# Patient Record
Sex: FEMALE | Race: WHITE | NOT HISPANIC OR LATINO | Employment: FULL TIME | URBAN - METROPOLITAN AREA
[De-identification: names, ages, dates, MRNs, and addresses within clinical notes are randomized per-mention and may not be internally consistent; named-entity substitution may affect disease eponyms.]

---

## 2017-01-03 RX ORDER — CITALOPRAM 20 MG/1
30 TABLET ORAL DAILY
Qty: 135 TABLET | Refills: 1 | Status: SHIPPED | OUTPATIENT
Start: 2017-01-03 | End: 2017-04-03

## 2017-01-03 RX ORDER — BUSPIRONE HYDROCHLORIDE 10 MG/1
10 TABLET ORAL 3 TIMES DAILY PRN
Qty: 90 TABLET | Refills: 1 | Status: SHIPPED | OUTPATIENT
Start: 2017-01-03 | End: 2020-09-22

## 2017-07-05 RX ORDER — CITALOPRAM 20 MG/1
TABLET ORAL
Qty: 135 TABLET | Refills: 0 | Status: SHIPPED | OUTPATIENT
Start: 2017-07-05 | End: 2020-09-22

## 2020-09-22 ENCOUNTER — OFFICE VISIT (OUTPATIENT)
Dept: INTERNAL MEDICINE | Facility: CLINIC | Age: 35
End: 2020-09-22

## 2020-09-22 VITALS
HEIGHT: 65 IN | BODY MASS INDEX: 23.82 KG/M2 | HEART RATE: 66 BPM | WEIGHT: 143 LBS | SYSTOLIC BLOOD PRESSURE: 102 MMHG | TEMPERATURE: 98.2 F | DIASTOLIC BLOOD PRESSURE: 68 MMHG | OXYGEN SATURATION: 99 %

## 2020-09-22 DIAGNOSIS — K21.9 GASTROESOPHAGEAL REFLUX DISEASE, ESOPHAGITIS PRESENCE NOT SPECIFIED: ICD-10-CM

## 2020-09-22 DIAGNOSIS — D22.9 NUMEROUS SKIN MOLES: ICD-10-CM

## 2020-09-22 DIAGNOSIS — F41.9 ANXIETY: Primary | ICD-10-CM

## 2020-09-22 PROBLEM — I47.1 SVT (SUPRAVENTRICULAR TACHYCARDIA): Status: ACTIVE | Noted: 2020-09-22

## 2020-09-22 PROBLEM — I47.10 SVT (SUPRAVENTRICULAR TACHYCARDIA): Status: ACTIVE | Noted: 2020-09-22

## 2020-09-22 PROCEDURE — 90686 IIV4 VACC NO PRSV 0.5 ML IM: CPT | Performed by: NURSE PRACTITIONER

## 2020-09-22 PROCEDURE — 99214 OFFICE O/P EST MOD 30 MIN: CPT | Performed by: NURSE PRACTITIONER

## 2020-09-22 PROCEDURE — 90471 IMMUNIZATION ADMIN: CPT | Performed by: NURSE PRACTITIONER

## 2020-09-22 RX ORDER — PANTOPRAZOLE SODIUM 40 MG/1
40 TABLET, DELAYED RELEASE ORAL DAILY
Qty: 30 TABLET | Refills: 0 | Status: SHIPPED | OUTPATIENT
Start: 2020-09-22 | End: 2021-01-23

## 2020-09-22 NOTE — PROGRESS NOTES
"Jazzy Negrete is a 34 y.o. female who presents today to establish care and discuss anxiety.    Chief Complaint   Patient presents with   • Establish Care       34 year old female presents today to establish care and for medication refill of Sertraline. She reports anxiety is well controlled with medication. She complains of \"heart burn\" despite eliminating foods/beverages that could cause these symptoms.    Anxiety  Presents for follow-up visit. Symptoms include palpitations (yesterday last episode). Patient reports no chest pain or nausea. Symptoms occur rarely. The most recent episode lasted 30 minutes (at the most). The severity of symptoms is mild. The patient sleeps 8 hours per night. The quality of sleep is fair. Nighttime awakenings: several (3 times a night).     Compliance with medications is %. Treatment side effects: dizziness at first, but currently no side effects.       The following portions of the patient's history were reviewed and updated as appropriate: allergies, current medications, past family history, past medical history, past social history, past surgical history and problem list.     Past Medical History:   Diagnosis Date   • Anxiety    • SVT (supraventricular tachycardia) (CMS/HCC)        Past Surgical History:   Procedure Laterality Date   • CARDIAC ELECTROPHYSIOLOGY PROCEDURE     • DILATATION AND CURETTAGE     • LASIK     • WISDOM TOOTH EXTRACTION         Family History   Problem Relation Age of Onset   • Lupus Mother    • Hypertension Mother    • Hyperlipidemia Mother    • Arthritis Mother    • Diabetes Father    • Hypertension Father    • Hyperlipidemia Father    • Heart disease Father    • Celiac disease Brother    • Ulcerative colitis Brother        Social History     Socioeconomic History   • Marital status:      Spouse name: Not on file   • Number of children: Not on file   • Years of education: Not on file   • Highest education level: Not on file   Tobacco Use   • " "Smoking status: Never Smoker   Substance and Sexual Activity   • Alcohol use: Yes   • Drug use: Never   • Sexual activity: Yes     Partners: Male       No Known Allergies      Current Outpatient Medications:   •  metoprolol succinate XL (TOPROL-XL) 25 MG 24 hr tablet, Take 1 tablet by mouth Daily., Disp: 30 tablet, Rfl: 5  •  sertraline (ZOLOFT) 50 MG tablet, Take 1 tablet by mouth Daily., Disp: 90 tablet, Rfl: 0  •  pantoprazole (Protonix) 40 MG EC tablet, Take 1 tablet by mouth Daily., Disp: 30 tablet, Rfl: 0    Health Maintenance   Topic Date Due   • ANNUAL PHYSICAL  09/23/1989   • TDAP/TD VACCINES (1 - Tdap) 09/23/2005   • HEPATITIS C SCREENING  05/31/2016   • INFLUENZA VACCINE  08/01/2020   • PAP SMEAR  09/22/2022   •  AMB Pneumococcal Vaccine 65+ (1 of 1 - PPSV23) 09/23/2051   •  AMB Pneumococcal Vaccine 0-64  Aged Out        ROS    Review of Systems   Constitutional: Negative for chills and fever.   HENT: Negative for ear pain, sneezing and sore throat.    Eyes: Negative for blurred vision and visual disturbance.   Respiratory: Negative for cough.    Cardiovascular: Positive for palpitations (yesterday last episode). Negative for chest pain and leg swelling.   Gastrointestinal: Positive for indigestion. Negative for abdominal pain and nausea.   Genitourinary: Negative for dysuria and hematuria.   Skin: Positive for skin lesions (moles). Negative for rash.   Allergic/Immunologic: Negative for immunocompromised state.   Neurological: Negative for headache.   Hematological: Negative for adenopathy.   Psychiatric/Behavioral: Positive for sleep disturbance (getting up several times during the night for 3-year old child).       Visit Vitals  /68   Pulse 66   Temp 98.2 °F (36.8 °C)   Ht 165.1 cm (65\")   Wt 64.9 kg (143 lb)   SpO2 99%   Breastfeeding No   BMI 23.80 kg/m²       Physical Exam     Physical Exam  Vitals signs and nursing note reviewed.   Constitutional:       General: She is not in acute " distress.     Appearance: She is well-developed and normal weight. She is not diaphoretic.   HENT:      Head: Normocephalic and atraumatic.   Eyes:      Conjunctiva/sclera: Conjunctivae normal.      Pupils: Pupils are equal, round, and reactive to light.   Neck:      Musculoskeletal: Normal range of motion and neck supple.   Cardiovascular:      Rate and Rhythm: Normal rate.      Heart sounds: Murmur present. No friction rub. No gallop.    Pulmonary:      Effort: Pulmonary effort is normal.      Breath sounds: Normal breath sounds.   Abdominal:      Palpations: Abdomen is soft.      Tenderness: There is no abdominal tenderness.   Musculoskeletal: Normal range of motion.   Skin:     General: Skin is warm and dry.      Capillary Refill: Capillary refill takes less than 2 seconds.      Findings: No rash.   Neurological:      Mental Status: She is alert and oriented to person, place, and time.   Psychiatric:         Attention and Perception: Attention normal.         Speech: Speech normal.         Behavior: Behavior normal. Behavior is cooperative.         Thought Content: Thought content normal.         Cognition and Memory: Cognition normal.         Judgment: Judgment normal.         Assessment/Plan   Problems Addressed this Visit        Digestive    Gastroesophageal reflux disease     Started Protonix 40 mg daily         Relevant Medications    pantoprazole (Protonix) 40 MG EC tablet    Other Relevant Orders    H.pylori,IgG / IgA Antibodies       Other    Anxiety - Primary     Continue Zoloft 50 mg daily.         Relevant Medications    sertraline (ZOLOFT) 50 MG tablet    Other Relevant Orders    CBC Auto Differential    Comprehensive Metabolic Panel    Lipid Panel    TSH Rfx On Abnormal To Free T4    Vitamin D 25 Hydroxy    Hemoglobin A1c    Hepatitis C Antibody      Other Visit Diagnoses     Numerous skin moles        Dermatology referral placed.    Relevant Orders    Ambulatory Referral to Dermatology (Completed)  "             Plan of care reviewed with patient at the conclusion of today's visit. Education was provided regarding diagnosis, management and any prescribed or recommended OTC medications.  Patient verbalizes understanding of and agreement with management plan.    “I, the teaching provider, verify the accuracy of all student documentation.  I further attest that I was physically present during the HPI portion of the encounter, and personally performed/re-performed the exam, assessment, and plan.\" MARINA Fernández, APRN, FNP-C      Return in about 3 months (around 12/22/2020) for Annual, Come back for fasting labs after your appointment.    Ang Whitehead APRN  "

## 2020-09-30 ENCOUNTER — LAB (OUTPATIENT)
Dept: LAB | Facility: HOSPITAL | Age: 35
End: 2020-09-30

## 2020-09-30 DIAGNOSIS — F41.9 ANXIETY: ICD-10-CM

## 2020-09-30 DIAGNOSIS — K21.9 GASTROESOPHAGEAL REFLUX DISEASE, ESOPHAGITIS PRESENCE NOT SPECIFIED: ICD-10-CM

## 2020-09-30 LAB
25(OH)D3 SERPL-MCNC: 54.6 NG/ML (ref 30–100)
ALBUMIN SERPL-MCNC: 4.5 G/DL (ref 3.5–5.2)
ALBUMIN/GLOB SERPL: 1.6 G/DL
ALP SERPL-CCNC: 46 U/L (ref 39–117)
ALT SERPL W P-5'-P-CCNC: 22 U/L (ref 1–33)
ANION GAP SERPL CALCULATED.3IONS-SCNC: 11.3 MMOL/L (ref 5–15)
AST SERPL-CCNC: 18 U/L (ref 1–32)
BASOPHILS # BLD AUTO: 0.03 10*3/MM3 (ref 0–0.2)
BASOPHILS NFR BLD AUTO: 0.7 % (ref 0–1.5)
BILIRUB SERPL-MCNC: 0.7 MG/DL (ref 0–1.2)
BUN SERPL-MCNC: 17 MG/DL (ref 6–20)
BUN/CREAT SERPL: 22.1 (ref 7–25)
CALCIUM SPEC-SCNC: 9.7 MG/DL (ref 8.6–10.5)
CHLORIDE SERPL-SCNC: 101 MMOL/L (ref 98–107)
CHOLEST SERPL-MCNC: 146 MG/DL (ref 0–200)
CO2 SERPL-SCNC: 27.7 MMOL/L (ref 22–29)
CREAT SERPL-MCNC: 0.77 MG/DL (ref 0.57–1)
DEPRECATED RDW RBC AUTO: 40.4 FL (ref 37–54)
EOSINOPHIL # BLD AUTO: 0.06 10*3/MM3 (ref 0–0.4)
EOSINOPHIL NFR BLD AUTO: 1.4 % (ref 0.3–6.2)
ERYTHROCYTE [DISTWIDTH] IN BLOOD BY AUTOMATED COUNT: 12 % (ref 12.3–15.4)
GFR SERPL CREATININE-BSD FRML MDRD: 86 ML/MIN/1.73
GLOBULIN UR ELPH-MCNC: 2.8 GM/DL
GLUCOSE SERPL-MCNC: 84 MG/DL (ref 65–99)
HBA1C MFR BLD: 4.8 % (ref 4.8–5.6)
HCT VFR BLD AUTO: 41.9 % (ref 34–46.6)
HCV AB SER DONR QL: NORMAL
HDLC SERPL-MCNC: 50 MG/DL (ref 40–60)
HGB BLD-MCNC: 14.1 G/DL (ref 12–15.9)
IMM GRANULOCYTES # BLD AUTO: 0.01 10*3/MM3 (ref 0–0.05)
IMM GRANULOCYTES NFR BLD AUTO: 0.2 % (ref 0–0.5)
LDLC SERPL CALC-MCNC: 84 MG/DL (ref 0–100)
LDLC/HDLC SERPL: 1.69 {RATIO}
LYMPHOCYTES # BLD AUTO: 1.28 10*3/MM3 (ref 0.7–3.1)
LYMPHOCYTES NFR BLD AUTO: 29.6 % (ref 19.6–45.3)
MCH RBC QN AUTO: 30.8 PG (ref 26.6–33)
MCHC RBC AUTO-ENTMCNC: 33.7 G/DL (ref 31.5–35.7)
MCV RBC AUTO: 91.5 FL (ref 79–97)
MONOCYTES # BLD AUTO: 0.27 10*3/MM3 (ref 0.1–0.9)
MONOCYTES NFR BLD AUTO: 6.3 % (ref 5–12)
NEUTROPHILS NFR BLD AUTO: 2.67 10*3/MM3 (ref 1.7–7)
NEUTROPHILS NFR BLD AUTO: 61.8 % (ref 42.7–76)
NRBC BLD AUTO-RTO: 0 /100 WBC (ref 0–0.2)
PLATELET # BLD AUTO: 262 10*3/MM3 (ref 140–450)
PMV BLD AUTO: 11 FL (ref 6–12)
POTASSIUM SERPL-SCNC: 4.3 MMOL/L (ref 3.5–5.2)
PROT SERPL-MCNC: 7.3 G/DL (ref 6–8.5)
RBC # BLD AUTO: 4.58 10*6/MM3 (ref 3.77–5.28)
SODIUM SERPL-SCNC: 140 MMOL/L (ref 136–145)
TRIGL SERPL-MCNC: 58 MG/DL (ref 0–150)
TSH SERPL DL<=0.05 MIU/L-ACNC: 1.93 UIU/ML (ref 0.27–4.2)
VLDLC SERPL-MCNC: 11.6 MG/DL (ref 5–40)
WBC # BLD AUTO: 4.32 10*3/MM3 (ref 3.4–10.8)

## 2020-09-30 PROCEDURE — 36415 COLL VENOUS BLD VENIPUNCTURE: CPT

## 2020-09-30 PROCEDURE — 86677 HELICOBACTER PYLORI ANTIBODY: CPT | Performed by: NURSE PRACTITIONER

## 2020-09-30 PROCEDURE — 84443 ASSAY THYROID STIM HORMONE: CPT | Performed by: NURSE PRACTITIONER

## 2020-09-30 PROCEDURE — 83036 HEMOGLOBIN GLYCOSYLATED A1C: CPT | Performed by: NURSE PRACTITIONER

## 2020-09-30 PROCEDURE — 85025 COMPLETE CBC W/AUTO DIFF WBC: CPT | Performed by: NURSE PRACTITIONER

## 2020-09-30 PROCEDURE — 82306 VITAMIN D 25 HYDROXY: CPT | Performed by: NURSE PRACTITIONER

## 2020-09-30 PROCEDURE — 80061 LIPID PANEL: CPT | Performed by: NURSE PRACTITIONER

## 2020-09-30 PROCEDURE — 86803 HEPATITIS C AB TEST: CPT | Performed by: NURSE PRACTITIONER

## 2020-09-30 PROCEDURE — 80053 COMPREHEN METABOLIC PANEL: CPT | Performed by: NURSE PRACTITIONER

## 2020-10-01 LAB
H PYLORI IGA SER-ACNC: <9 UNITS (ref 0–8.9)
H PYLORI IGG SER IA-ACNC: 0.18 INDEX VALUE (ref 0–0.79)

## 2020-11-30 RX ORDER — METRONIDAZOLE 7.5 MG/G
GEL TOPICAL 2 TIMES DAILY
Qty: 45 G | Refills: 1 | Status: SHIPPED | OUTPATIENT
Start: 2020-11-30 | End: 2021-05-05 | Stop reason: SDUPTHER

## 2020-12-03 RX ORDER — METOPROLOL SUCCINATE 25 MG/1
25 TABLET, EXTENDED RELEASE ORAL DAILY
Qty: 30 TABLET | Refills: 5 | OUTPATIENT
Start: 2020-12-03

## 2020-12-04 ENCOUNTER — PATIENT MESSAGE (OUTPATIENT)
Dept: INTERNAL MEDICINE | Facility: CLINIC | Age: 35
End: 2020-12-04

## 2020-12-04 DIAGNOSIS — K21.9 GASTROESOPHAGEAL REFLUX DISEASE: ICD-10-CM

## 2020-12-04 DIAGNOSIS — F41.9 ANXIETY: ICD-10-CM

## 2020-12-04 RX ORDER — METOPROLOL SUCCINATE 25 MG/1
25 TABLET, EXTENDED RELEASE ORAL DAILY
Qty: 30 TABLET | Refills: 2 | Status: SHIPPED | OUTPATIENT
Start: 2020-12-04 | End: 2021-01-23 | Stop reason: SDUPTHER

## 2020-12-21 DIAGNOSIS — F41.9 ANXIETY: ICD-10-CM

## 2020-12-22 ENCOUNTER — DOCUMENTATION (OUTPATIENT)
Dept: INTERNAL MEDICINE | Facility: CLINIC | Age: 35
End: 2020-12-22

## 2020-12-22 NOTE — TELEPHONE ENCOUNTER
Last Office Visit: 9/22/20  Next Office Visit: 1/13/21    Labs completed in past 6 months? yes  Labs completed in past year? yes    Last Refill Date: 9/22/20  Quantity: 90  Refills: 0    Pharmacy: on file

## 2021-01-23 ENCOUNTER — OFFICE VISIT (OUTPATIENT)
Dept: INTERNAL MEDICINE | Facility: CLINIC | Age: 36
End: 2021-01-23

## 2021-01-23 VITALS
DIASTOLIC BLOOD PRESSURE: 70 MMHG | HEART RATE: 70 BPM | TEMPERATURE: 97.6 F | BODY MASS INDEX: 25.69 KG/M2 | HEIGHT: 65 IN | OXYGEN SATURATION: 98 % | SYSTOLIC BLOOD PRESSURE: 126 MMHG | WEIGHT: 154.2 LBS

## 2021-01-23 DIAGNOSIS — Z00.00 HEALTHCARE MAINTENANCE: ICD-10-CM

## 2021-01-23 DIAGNOSIS — F41.1 GENERALIZED ANXIETY DISORDER: ICD-10-CM

## 2021-01-23 DIAGNOSIS — Z00.00 ANNUAL PHYSICAL EXAM: Primary | ICD-10-CM

## 2021-01-23 DIAGNOSIS — R00.2 PALPITATIONS: ICD-10-CM

## 2021-01-23 DIAGNOSIS — R63.5 WEIGHT GAIN: ICD-10-CM

## 2021-01-23 PROCEDURE — 99395 PREV VISIT EST AGE 18-39: CPT | Performed by: NURSE PRACTITIONER

## 2021-01-23 RX ORDER — METOPROLOL SUCCINATE 25 MG/1
25 TABLET, EXTENDED RELEASE ORAL DAILY
Qty: 30 TABLET | Refills: 2 | Status: SHIPPED | OUTPATIENT
Start: 2021-01-23 | End: 2021-01-23 | Stop reason: SDUPTHER

## 2021-01-23 RX ORDER — METOPROLOL SUCCINATE 25 MG/1
25 TABLET, EXTENDED RELEASE ORAL DAILY
Qty: 90 TABLET | Refills: 3 | Status: SHIPPED | OUTPATIENT
Start: 2021-01-23 | End: 2022-02-11

## 2021-01-23 NOTE — PROGRESS NOTES
After Visit Summary   10/24/2017    Velma Diaz    MRN: [id number]           Patient Information     Date Of Birth          1969        Visit Information        Provider Department      10/24/2017 3:20 PM Jessy Burgos APRN CNP [facility]        Today's Diagnoses     Cat bite, initial encounter    -  1       Follow-ups after your visit        Your next 10 appointments already scheduled     Nov 06, 2017 10:00 AM CST   Return Visit with SERVANDO Yan   [facility] ([address] [address]   [phone]            Nov 27, 2017 10:00 AM CST   Return Visit with SERVANDO Yan   [facility] ([address] [address]   [phone]            Nov 29, 2017 10:00 AM CST   Return Visit with Debra Hicks MD   [facility] ([address] [id number] [phone]              Who to contact     If you have questions or need follow up information about today's clinic visit or your schedule please contact [facility] directly at [phone].  Normal or non-critical lab and imaging results will be communicated to you by MyChart, letter or phone within 4 business days after the clinic has received the results. If you do not hear from us within 7 days, please contact the clinic through MyChart or phone. If you have a critical or abnormal lab result, we will notify you by phone as soon as possible.  Submit refill requests through [employer] or call your pharmacy and they will forward the refill request to us. Please allow 3 business days for your refill to be completed.          Additional Information About Your Visit        [employer] Information     [employer] gives you secure access to your electronic health  Chief Complaint   Patient presents with   • Annual Exam       History of Present Illness  35 y.o.female presents for health maintenance, Adult Female:   General Health: The patient's health is described as good. She denies vision problems. She denies hearing loss. Immunizations status reviewed and plan to update today if needed.  Chronic medical problems:  Anxiety  Presents for follow-up visit. Symptoms include palpitations. Patient reports no chest pain, depressed mood, dizziness, nausea, nervous/anxious behavior or shortness of breath. Symptoms occur occasionally. The severity of symptoms is mild. The quality of sleep is good.     Her past medical history is significant for anxiety/panic attacks. Compliance with medications is % (takes zoloft). Treatment side effects: None.   Palpitations   This is a chronic problem. The current episode started more than 1 year ago (Had SVT couple years ago). The problem occurs rarely. The problem has been gradually improving. Pertinent negatives include no anxiety, chest pain, coughing, dizziness, irregular heartbeat, nausea, shortness of breath or vomiting. Associated symptoms comments: No palpitations recently on medications. She has tried beta blockers for the symptoms. The treatment provided significant relief. Her past medical history is significant for anxiety.   wt gain; would like to have referral for exercise instruction  Social History/Lifestyle:   Social History     Socioeconomic History   • Marital status:    Tobacco Use   • Smoking status: Never Smoker   Substance and Sexual Activity   • Alcohol use: Yes   • Drug use: Never   • Sexual activity: Yes     Partners: Male     Reproductive health:  Mirena. Last pap 2019; has had abnl pap in past but has been greater than 10 years ago.  No vaginal pain, vaginal drainage, pelvic pain, flank pain, or dysuria.  No breast complaints.  Screening:   Health Maintenance reviewed.  Health Maintenance   Topic Date Due    • ANNUAL PHYSICAL  09/23/1989   • PAP SMEAR  09/22/2022   • TDAP/TD VACCINES (2 - Td) 03/05/2028   • HEPATITIS C SCREENING  Completed   • INFLUENZA VACCINE  Completed   • Pneumococcal Vaccine 0-64  Aged Out   • MENINGOCOCCAL VACCINE  Aged Out          Review of Systems   Constitutional: Positive for unexpected weight gain. Negative for chills and fatigue.   HENT: Negative for congestion, postnasal drip and rhinorrhea.    Eyes: Negative for blurred vision and visual disturbance.   Respiratory: Negative for cough and shortness of breath.    Cardiovascular: Positive for palpitations. Negative for chest pain and leg swelling.   Gastrointestinal: Negative for constipation, diarrhea, nausea and vomiting.   Genitourinary: Negative for difficulty urinating.   Musculoskeletal: Negative for arthralgias.   Skin: Negative for rash.   Neurological: Negative for dizziness and headache.   Psychiatric/Behavioral: Negative for depressed mood. The patient is not nervous/anxious.          [facility]  The following portions of the patient's history were reviewed and updated as appropriate: allergies, current medications, past family history, past medical history, past social history, past surgical history and problem list.     Past Medical History:   Diagnosis Date   • Anxiety    • SVT (supraventricular tachycardia) (CMS/HCC)       Past Surgical History:   Procedure Laterality Date   • CARDIAC ELECTROPHYSIOLOGY PROCEDURE     • DILATATION AND CURETTAGE     • LASIK     • WISDOM TOOTH EXTRACTION        No Known Allergies   Family History   Problem Relation Age of Onset   • Lupus Mother    • Hypertension Mother    • Hyperlipidemia Mother    • Arthritis Mother    • Diabetes Father    • Hypertension Father    • Hyperlipidemia Father    • Heart disease Father    • Celiac disease Brother    • Ulcerative colitis Brother             Current Outpatient Medications:   •  metoprolol succinate XL (TOPROL-XL) 25 MG 24 hr tablet, Take 1 tablet by mouth  record. If you see a primary care provider, you can also send messages to your care team and make appointments. If you have questions, please call your primary care clinic.  If you do not have a primary care provider, please call [phone] and they will assist you.        Care EveryWhere ID     This is your Care EveryWhere ID. This could be used by other organizations to access your [facility] [address] medical records  [id number]        Your Vitals Were     Pulse Temperature Pulse Oximetry Breastfeeding? BMI (Body Mass Index)       79 97  F (36.1  C) (Oral) 97% No 39.58 kg/m2        Blood Pressure from Last 3 Encounters:   10/24/17 96/67   10/18/17 111/78   10/12/17 107/69    Weight from Last 3 Encounters:   10/24/17 227 lb (103 kg)   10/18/17 222 lb (100.7 kg)   10/12/17 222 lb (100.7 kg)              We Performed the Following     IMMUNIZATION ADMIN, FIRST     TD PRESERV FREE >=7 YRS ADS IM          Today's Medication Changes          These changes are accurate as of: 10/24/17  4:04 PM.  If you have any questions, ask your nurse or doctor.               Start taking these medicines.        Dose/Directions    doxycycline 100 MG capsule   Commonly known as:  VIBRAMYCIN   Used for:  Cat bite, initial encounter   Started by:  Jessy Burgos APRN CNP        Dose:  100 mg   Take 1 capsule (100 mg) by mouth 2 times daily   Quantity:  20 capsule   Refills:  0            Where to get your medicines      These medications were sent to [facility]/pharmacy #[id number] - [id number] [address]     Phone:  [phone]     doxycycline 100 MG capsule                Primary Care Provider Office Phone # Fax #    Srinivasa Hunter [phone]       [address] [facility] [address]        Goals        General    I will call within next 2 weeks to schedule initial psychiatry appointment (pt-stated)     Notes - Note edited  5/20/2015 12:51 PM by Yesica Marsh     As of today's date 5/20/2015 goal is met at 76 - 100%.   Goal Status:  Complete  Patient states this is scheduled with Dr. Salvador for next month, but not on appointment calendar  LUDY Stark, MSW   [phone]  5/20/2015 12:51 PM        I will complete [employer] or reduced fare application within the next month (pt-stated)     Notes - Note edited  11/24/2015 12:00 PM by Yesica Marsh    As of today's date 11/24/2015 goal is met at 51 - 75%.   Goal Status:  Active  She reported today having [employer] application, [employer] worker has requested but packet from [employer].    ULDY Stark, MSW   [phone]  11/24/2015 12:00 PM        I will discuss [employer] worker with therapist next week for housing needs  (pt-stated)     Notes - Note edited  12/3/2015  1:17 PM by Yesica Marsh    As of today's date 12/3/2015 goal is met at 76 - 100%.   Goal Status:  Discontinued  Patient has [employer] worker and has found housing with friend  LUDY Stark, MSW   [phone]  12/3/2015 1:16 PM        Equal Access to Services     LOLA HERNANDEZ AH: Hadii aad ku hadasho Soomaali, waaxda luqadaha, qaybta kaalmada adeegyada, waxay idiin haysivan speedy sauceda. So [facility] [phone].    ATENCIÓN: Si habla español, tiene a dowd disposición servicios gratuitos de asistencia lingüística. Llame al [phone].    We comply with applicable federal civil rights laws and [place] laws. We do not discriminate on the basis of race, color, national origin, age, disability, sex, sexual orientation, or gender identity.            Thank you!     Thank you for choosing [facility]  for your care. Our goal is always to provide you with excellent care. Hearing back from our patients is one way we can continue to improve our services. Please take a few minutes to complete the written survey that you may receive in the mail after your visit with us. Thank you!             Your Updated  "Daily., Disp: 30 tablet, Rfl: 2  •  metroNIDAZOLE (METROGEL) 0.75 % gel, Apply topically to the appropriate area as directed 2 (Two) Times a Day., Disp: 45 g, Rfl: 1  •  pantoprazole (Protonix) 40 MG EC tablet, Take 1 tablet by mouth Daily., Disp: 30 tablet, Rfl: 0  •  sertraline (Zoloft) 50 MG tablet, Take 1.5 tablets by mouth Daily., Disp: 135 tablet, Rfl: 0    VITALS:  /70   Pulse 70   Temp 97.6 °F (36.4 °C)   Ht 165.1 cm (65\")   Wt 69.9 kg (154 lb 3.2 oz)   SpO2 98%   Breastfeeding No   BMI 25.66 kg/m²     Physical Exam  Vitals signs reviewed.   Constitutional:       General: She is not in acute distress.     Appearance: She is well-developed. She is not ill-appearing or diaphoretic.   HENT:      Head: Normocephalic.      Right Ear: Tympanic membrane, ear canal and external ear normal.      Left Ear: Tympanic membrane, ear canal and external ear normal.      Nose: Nose normal.      Mouth/Throat:      Mouth: Mucous membranes are moist.      Pharynx: Oropharynx is clear. No oropharyngeal exudate or posterior oropharyngeal erythema.   Eyes:      General: Lids are normal.         Right eye: No discharge.         Left eye: No discharge.      Extraocular Movements: Extraocular movements intact.      Conjunctiva/sclera: Conjunctivae normal.      Pupils: Pupils are equal, round, and reactive to light.   Neck:      Musculoskeletal: Normal range of motion and neck supple.      Thyroid: No thyromegaly.      Vascular: No JVD.   Cardiovascular:      Rate and Rhythm: Normal rate and regular rhythm.      Pulses: Normal pulses.      Heart sounds: Normal heart sounds.      Comments: No edema  Pulmonary:      Effort: Pulmonary effort is normal. No respiratory distress.      Breath sounds: Normal breath sounds.   Abdominal:      General: Bowel sounds are normal. There is no distension or abdominal bruit.      Palpations: Abdomen is soft. There is no hepatomegaly, splenomegaly or mass.      Tenderness: There is no " Medication List - Protect others around you: Learn how to safely use, store and throw away your medicines at www.disposemymeds.org.          This list is accurate as of: 10/24/17  4:04 PM.  Always use your most recent med list.                   Brand Name Dispense Instructions for use Diagnosis    acetaminophen 325 MG tablet    TYLENOL    100 tablet    Take 2 tablets (650 mg) by mouth every 4 hours as needed for other (mild pain)    Lesion of left ulnar nerve       cyclobenzaprine 10 MG tablet    FLEXERIL    30 tablet    Take 0.5-1 tablets (5-10 mg) by mouth 3 times daily as needed for muscle spasms    Chronic bilateral back pain, unspecified back location       doxycycline 100 MG capsule    VIBRAMYCIN    20 capsule    Take 1 capsule (100 mg) by mouth 2 times daily    Cat bite, initial encounter       FLUoxetine 40 MG capsule    PROzac    30 capsule    Take 1 capsule (40 mg) by mouth daily    Major depressive disorder, recurrent episode, moderate (H), ROSE MARIE (generalized anxiety disorder)       furosemide 20 MG tablet    LASIX    30 tablet    Take 1 tablet (20 mg) by mouth daily    Leg swelling       MULTI FOR HER PO      Take by mouth daily        nystatin [id number] UNIT/GM Powd    MYCOSTATIN    60 g    Apply to affected area twice daily as needed    Candidal intertrigo       order for DME      Respironics REMSTAR 60 Series Auto [id number] H2O, Airfit P10 nasal pillow mask w/xsmall pillows        oxyCODONE-acetaminophen  MG per tablet    PERCOCET    90 tablet    Take 1 tablet by mouth every 6 hours as needed for moderate to severe pain    Chronic pain syndrome       Pregabalin 225 MG Caps    LYRICA    60 capsule    Take 225 mg by mouth 2 times daily    Chronic pain syndrome       rOPINIRole 0.25 MG tablet    REQUIP    180 tablet    Week 1 & 2: 1 tablet 1 hour before going to bed. Week 2 and afterwards: 1 tablet two times a day    Restless legs syndrome (RLS)       SUMAtriptan 100 MG tablet    IMITREX    9 tablet     abdominal tenderness. There is no right CVA tenderness or left CVA tenderness.      Hernia: No hernia is present.   Musculoskeletal: Normal range of motion.      Comments: All major joints with normal ROM   Lymphadenopathy:      Head:      Right side of head: No submental, submandibular or tonsillar adenopathy.      Left side of head: No submental, submandibular or tonsillar adenopathy.      Cervical: No cervical adenopathy.   Skin:     General: Skin is warm and dry.      Capillary Refill: Capillary refill takes less than 2 seconds.      Findings: No rash.   Neurological:      General: No focal deficit present.      Mental Status: She is alert and oriented to person, place, and time.      Cranial Nerves: No cranial nerve deficit.      Coordination: Coordination normal.      Gait: Gait normal.   Psychiatric:         Mood and Affect: Mood normal.         Speech: Speech normal.         Behavior: Behavior normal.         LABS  pending    ASSESSMENT/PLAN  Diagnoses and all orders for this visit:    1. Annual physical exam (Primary)    2. Generalized anxiety disorder  -     sertraline (Zoloft) 50 MG tablet; Take 1.5 tablets by mouth Daily.  Dispense: 135 tablet; Refill: 3    3. Palpitations  -     metoprolol succinate XL (TOPROL-XL) 25 MG 24 hr tablet; Take 1 tablet by mouth Daily.  Dispense: 90 tablet; Refill: 3    4. Healthcare maintenance  -     Ambulatory Referral to Exercise Education    5. Weight gain  -     Ambulatory Referral to Exercise Education    Nutrition and activity goals reviewed including: mainly water to drink, limit white flour/processed sugar; increase high protein, high fiber carbs, good breakfast, working toward 150 mins cardio per week, resistance training 2x/week.    The patient is here for a health maintenance visit. Screening lab work is ordered.  Immunizations are reported as current.  Advice and education is given regarding nutrition, aerobic exercise, routine dental evaluations, routine eye  Take 1 tablet (100 mg) by mouth at onset of headache for migraine May repeat in 2 hours if needed: max 2/day    Headache(784.0)          exams, reproductive health, cardiovascular risk reduction.  Further recommendations after lab evaluation.  Annual wellness evaluations recommended.     I discussed the patients findings and my recommendations with patient.  Patient was encouraged to keep me informed of any acute changes or any new concerning symptoms.    Patient voiced understanding of all instructions and denied further questions.      FOLLOW-UP  Return in about 1 year (around 1/23/2022) for Annual.    Electronically signed by:    GEE Garcia  01/23/2021    EMR Dragon/Transcription Disclaimer:  Much of this encounter note is an electronic transcription/translation of spoken language to printed text.  The electronic translation of spoken language may permit erroneous, or at times, nonsensical words or phrases to be inadvertently transcribed.  Although I have reviewed the note for such errors, some may still exist

## 2021-02-10 ENCOUNTER — APPOINTMENT (OUTPATIENT)
Dept: OTHER | Facility: HOSPITAL | Age: 36
End: 2021-02-10

## 2021-05-05 ENCOUNTER — OFFICE VISIT (OUTPATIENT)
Dept: INTERNAL MEDICINE | Facility: CLINIC | Age: 36
End: 2021-05-05

## 2021-05-05 ENCOUNTER — LAB (OUTPATIENT)
Dept: LAB | Facility: HOSPITAL | Age: 36
End: 2021-05-05

## 2021-05-05 VITALS
BODY MASS INDEX: 25.66 KG/M2 | DIASTOLIC BLOOD PRESSURE: 78 MMHG | HEART RATE: 56 BPM | OXYGEN SATURATION: 99 % | TEMPERATURE: 97.1 F | SYSTOLIC BLOOD PRESSURE: 112 MMHG | HEIGHT: 65 IN

## 2021-05-05 DIAGNOSIS — M25.531 RIGHT WRIST PAIN: Primary | ICD-10-CM

## 2021-05-05 DIAGNOSIS — F41.9 ANXIETY: ICD-10-CM

## 2021-05-05 DIAGNOSIS — Z82.69 FAMILY HISTORY OF SYSTEMIC LUPUS ERYTHEMATOSUS: ICD-10-CM

## 2021-05-05 DIAGNOSIS — M25.531 RIGHT WRIST PAIN: ICD-10-CM

## 2021-05-05 DIAGNOSIS — N76.0 ACUTE VAGINITIS: ICD-10-CM

## 2021-05-05 PROCEDURE — 86038 ANTINUCLEAR ANTIBODIES: CPT

## 2021-05-05 PROCEDURE — 99213 OFFICE O/P EST LOW 20 MIN: CPT | Performed by: NURSE PRACTITIONER

## 2021-05-05 RX ORDER — BUPROPION HYDROCHLORIDE 150 MG/1
150 TABLET ORAL DAILY
Qty: 30 TABLET | Refills: 0 | Status: SHIPPED | OUTPATIENT
Start: 2021-05-05 | End: 2021-07-20

## 2021-05-05 RX ORDER — METRONIDAZOLE 7.5 MG/G
GEL TOPICAL 2 TIMES DAILY
Qty: 45 G | Refills: 1 | Status: SHIPPED | OUTPATIENT
Start: 2021-05-05 | End: 2022-02-11

## 2021-05-07 LAB — ANA SER QL: NEGATIVE

## 2021-05-12 ENCOUNTER — PATIENT MESSAGE (OUTPATIENT)
Dept: INTERNAL MEDICINE | Facility: CLINIC | Age: 36
End: 2021-05-12

## 2021-05-12 DIAGNOSIS — M25.531 RIGHT WRIST PAIN: Primary | ICD-10-CM

## 2021-05-12 NOTE — TELEPHONE ENCOUNTER
From: Jazzy CEDENO Click  To: GEE Leung  Sent: 5/12/2021 8:48 AM EDT  Subject: Non-Urgent Medical Question    Good Morning,  Can you please put a referral in for physical therapy for my wrist?    Thanks,  Jazzy Negrete

## 2021-06-11 ENCOUNTER — TREATMENT (OUTPATIENT)
Dept: PHYSICAL THERAPY | Facility: CLINIC | Age: 36
End: 2021-06-11

## 2021-06-11 DIAGNOSIS — M25.531 RIGHT WRIST PAIN: ICD-10-CM

## 2021-06-11 DIAGNOSIS — M79.641 PAIN OF RIGHT HAND: Primary | ICD-10-CM

## 2021-06-11 PROCEDURE — 97162 PT EVAL MOD COMPLEX 30 MIN: CPT | Performed by: PHYSICAL THERAPIST

## 2021-06-11 PROCEDURE — 97110 THERAPEUTIC EXERCISES: CPT | Performed by: PHYSICAL THERAPIST

## 2021-06-11 NOTE — PROGRESS NOTES
Physical Therapy Initial Evaluation and Plan of Care    Patient: Jazzy Negrete   : 1985  Diagnosis/ICD-10 Code:  No primary diagnosis found.  Referring practitioner: Ang Whitehead, *  Date of Initial Visit: 2021  Today's Date: 2021  Patient seen for 1 sessions             Subjective Evaluation    History of Present Illness  Mechanism of injury: Pt states that about 2 months ago she started to have pain in the right wrist. She was having pain with almost any movement and with pushing. She also has experienced weakness in the right hand that has caused her to drop items. The weakness is intermittent and depends on use. She denies any numbness/tingling/burning in the hand, but has had some occasional mild numbness on the side of the right wrist.        Patient Occupation: Nurse practioner - [employer]  Quality of life: good    Pain  Current pain rating: 3  At best pain ratin  At worst pain ratin  Location: ulnar aspect of the right wrist   Quality: dull ache and sharp  Relieving factors: rest, medications and heat (aleve, ibuprofen)  Aggravating factors: movement and lifting  Progression: improved    Hand dominance: right    Treatments  Previous treatment: immobilization and medication  Patient Goals  Patient goals for therapy: decreased edema, decreased pain, increased motion and increased strength             Objective          Palpation     Additional Palpation Details  Mild TTP noted at the right ulnocarpal joint     Muscle wasting noted at the right hypothenar eminence     Active Range of Motion   Left Shoulder   Normal active range of motion    Right Shoulder   Normal active range of motion    Left Wrist   Wrist flexion: 95 degrees   Wrist extension: 68 degrees   Radial deviation: 24 degrees   Ulnar deviation: 21 degrees     Right Wrist   Wrist flexion: 89 degrees   Wrist extension: 68 degrees   Radial deviation: 16 degrees   Ulnar deviation: 31 degrees     Strength/Myotome  Testing     Left Wrist/Hand   Wrist extension: 5  Wrist flexion: 5     (2nd hand position)     Trial 1: 40 lbs    Thumb Strength  Key/Lateral Pinch     Trial 1: 13 lbs  Palmar/Three-Point Pinch     Trial 1: 11 lbs    Right Wrist/Hand   Wrist extension: 4+  Wrist flexion: 4+     (2nd hand position)     Trial 1: 40 lbs    Thumb Strength   Key/Lateral Pinch     Trial 1: 10 lbs  Palmar/Three-Point Pinch     Trial 1: 6 lbs    Tests   Cervical     Right   Negative Adson maneuver, active compression ([place]) and Spurling's sign.     Right Shoulder   Positive [place].     Right Elbow   Positive Tinel's sign (cubital tunnel).     Right Wrist/Hand   Positive TFCC load.     Additional Tests Details  Cubital tunnel compression +     Ulnar nerve tension test +           Assessment & Plan     Assessment  Impairments: abnormal muscle firing, abnormal muscle tone, abnormal or restricted ROM, activity intolerance, impaired physical strength, lacks appropriate home exercise program and pain with function  Assessment details: Patient is a 35 year old female who comes to physical therapy with c/o pain in the right wrist/hand with intermittent weakness. Signs and symptoms are consistent with possible ulnar neuropathy vs TFCC pathology resulting in pain, decreased ROM, decreased strength, and inability to perform all essential functional activities. Pt will benefit from skilled PT services to address the above issues.     Prognosis details:   SHORT TERM GOALS:   2 weeks    1. Pt to be I with HEP  2. right hand/wrist AROM equal to that of the left hand/wrist to show improve mobility   3. Pt to report 0/10 pain at rest in the right hand/wrist    LONG TERM GOALS:    4 weeks  1. Pt to demonstrate right  strength equal to that of left for improved ability to perform lifting activities  2. Pt to report being able to return to work full duty without limitation or exacerbation of symptoms  3. Pt to demonstrate ability to perform 40#  box lift without pain in the right hand      Functional Limitations: carrying objects, lifting, pulling, pushing, uncomfortable because of pain, reaching overhead and unable to perform repetitive tasks  Plan  Therapy options: will be seen for skilled physical therapy services  Planned modality interventions: cryotherapy, electrical stimulation/Russian stimulation, fluidotherapy, high voltage pulsed current (pain management), iontophoresis, microcurrent electrical stimulation, TENS, thermotherapy (hydrocollator packs) and ultrasound  Planned therapy interventions: ADL retraining, body mechanics training, fine motor coordination training, flexibility, functional ROM exercises, home exercise program, IADL retraining, joint mobilization, manual therapy, motor coordination training, neuromuscular re-education, soft tissue mobilization, strengthening, stretching and therapeutic activities  Frequency: 2x week  Duration in weeks: 6        Manual Therapy:         mins  48711;  Therapeutic Exercise:    23     mins  74740;     Neuromuscular Edita:        mins  93806;    Therapeutic Activity:          mins  47624;     Gait Training:           mins  72182;     Ultrasound:          mins  16666;    Electrical Stimulation:         mins  98267 ( );  Iontophoresis          mins 86076   Traction          mins  04840  Fluidotherapy          mins  70830  Dry Needling          mins self-pay  Paraffin          mins  16156    Timed Treatment:   23   mins   Total Treatment:     55   mins    PT SIGNATURE: Henrique Wiggins, PT, DPT, OCS, Cert. DN   DATE TREATMENT INITIATED: 6/11/2021    Initial Certification  Certification Period: 9/9/2021  I certify that the therapy services are furnished while this patient is under my care.  The services outlined above are required by this patient, and will be reviewed every 90 days.     PHYSICIAN: Ang Whitehead APRN      DATE:     Please sign and return via fax to [phone].. Thank you,  [facility].

## 2021-06-18 ENCOUNTER — TREATMENT (OUTPATIENT)
Dept: PHYSICAL THERAPY | Facility: CLINIC | Age: 36
End: 2021-06-18

## 2021-06-18 DIAGNOSIS — M79.641 PAIN OF RIGHT HAND: Primary | ICD-10-CM

## 2021-06-18 DIAGNOSIS — M25.531 RIGHT WRIST PAIN: ICD-10-CM

## 2021-06-18 PROCEDURE — 97112 NEUROMUSCULAR REEDUCATION: CPT | Performed by: PHYSICAL THERAPIST

## 2021-06-18 PROCEDURE — 97110 THERAPEUTIC EXERCISES: CPT | Performed by: PHYSICAL THERAPIST

## 2021-06-23 ENCOUNTER — TREATMENT (OUTPATIENT)
Dept: PHYSICAL THERAPY | Facility: CLINIC | Age: 36
End: 2021-06-23

## 2021-06-23 DIAGNOSIS — M79.641 PAIN OF RIGHT HAND: Primary | ICD-10-CM

## 2021-06-23 DIAGNOSIS — M25.531 RIGHT WRIST PAIN: ICD-10-CM

## 2021-06-23 PROCEDURE — DRYNDL PR CUSTOM DRY NEEDLING SELF PAY: Performed by: PHYSICAL THERAPIST

## 2021-06-25 NOTE — PROGRESS NOTES
Physical Therapy Daily Progress Note    Subjective   Jazzy Click reports that her wrist is feeling about the same. She continues to have high levels of pain with gripping and when pushing through the hand.     Today's Pain ratin/10    Objective   See Exercise, Manual, and Modality Logs for complete treatment.     Dry needling to the hypothenar eminence, tfcc, FCU     Assessment/Plan     Pt tolerated treatment well and did not report any adverse reaction to the treatment. Will assess her response at the next visit and progress as indicated.     Progress per Plan of Care and Progress strengthening /stabilization /functional activity           Manual Therapy:         mins  39227;  Therapeutic Exercise:         mins  53638;     Neuromuscular Edita:        mins  21624;    Therapeutic Activity:          mins  07711;     Gait Training:           mins  20005;     Ultrasound:          mins  10196;    Electrical Stimulation:         mins  40603 ( );  E-Stim Attended:         mins  33312  Iontophoresis          mins 79969   Traction          mins  35272  Fluidotherapy          mins  43086  Dry Needling     45     mins self-pay  Paraffin          mins  04729    Timed Treatment:   45   mins   Total Treatment:     45   mins    Henrique Wiggins, PT, DPT, OCS, Cert. DN  Physical Therapist

## 2021-06-30 ENCOUNTER — TREATMENT (OUTPATIENT)
Dept: PHYSICAL THERAPY | Facility: CLINIC | Age: 36
End: 2021-06-30

## 2021-06-30 DIAGNOSIS — M79.641 PAIN OF RIGHT HAND: Primary | ICD-10-CM

## 2021-06-30 DIAGNOSIS — M25.531 RIGHT WRIST PAIN: ICD-10-CM

## 2021-06-30 PROCEDURE — DRYNDL PR CUSTOM DRY NEEDLING SELF PAY: Performed by: PHYSICAL THERAPIST

## 2021-07-01 NOTE — PROGRESS NOTES
Physical Therapy Daily Progress Note    Subjective   Jazzy Negrete reports that she was sore for a couple of days after her last treatment with dry needling, but it feels better now. She has been able to use the hand more with less pain.     Today's Pain ratin/10    Objective   See Exercise, Manual, and Modality Logs for complete treatment.       Assessment/Plan     Improved tolerance to activity noted in the clinic today. Pt had a good response to dry needling treatment.     Progress per Plan of Care and Progress strengthening /stabilization /functional activity           Manual Therapy:         mins  93416;  Therapeutic Exercise:         mins  93092;     Neuromuscular Edita:        mins  93771;    Therapeutic Activity:          mins  56193;     Gait Training:           mins  64360;     Ultrasound:          mins  19932;    Electrical Stimulation:         mins  63130 ( );  E-Stim Attended:         mins  90947  Iontophoresis          mins 74870   Traction          mins  98883  Fluidotherapy          mins  08066  Dry Needling     35     mins self-pay  Paraffin          mins  88864    Timed Treatment:   35   mins   Total Treatment:     35   mins    Henrique Wiggins, PT, DPT, OCS, Cert. DN  Physical Therapist

## 2021-07-07 ENCOUNTER — TREATMENT (OUTPATIENT)
Dept: PHYSICAL THERAPY | Facility: CLINIC | Age: 36
End: 2021-07-07

## 2021-07-07 DIAGNOSIS — M79.641 PAIN OF RIGHT HAND: Primary | ICD-10-CM

## 2021-07-07 DIAGNOSIS — M25.531 RIGHT WRIST PAIN: ICD-10-CM

## 2021-07-07 PROCEDURE — 97110 THERAPEUTIC EXERCISES: CPT | Performed by: PHYSICAL THERAPIST

## 2021-07-07 PROCEDURE — 97140 MANUAL THERAPY 1/> REGIONS: CPT | Performed by: PHYSICAL THERAPIST

## 2021-07-07 PROCEDURE — 97530 THERAPEUTIC ACTIVITIES: CPT | Performed by: PHYSICAL THERAPIST

## 2021-07-08 NOTE — PROGRESS NOTES
Physical Therapy Daily Progress Note    Subjective   Jazzy Negrete reports that she feels like she is doing much better. She has infrequent pain with her typical ADL's but did have some mild exacerbation when playing volleyball over the weekend.     Today's Pain ratin/10    Objective   See Exercise, Manual, and Modality Logs for complete treatment.       Assessment/Plan     Signficant improvement noted in patients ROM, deven with supination. Pt able to tolerate activity in the clinic without exacerbation of symptoms.     Progress per Plan of Care and Progress strengthening /stabilization /functional activity           Manual Therapy:    16     mins  98251;  Therapeutic Exercise:    15     mins  92354;     Neuromuscular Edita:        mins  56227;    Therapeutic Activity:     23     mins  70163;     Gait Training:           mins  94637;     Ultrasound:          mins  45301;    Electrical Stimulation:         mins  85429 ( );  E-Stim Attended:         mins  55682  Iontophoresis          mins 83751   Traction          mins  13724  Fluidotherapy          mins  39209  Dry Needling          mins self-pay - No Charge  Paraffin          mins  09658    Timed Treatment:   54   mins   Total Treatment:     54   mins    Henrique Wiggins, PT, DPT, OCS, Cert. DN  Physical Therapist

## 2021-07-20 ENCOUNTER — PATIENT MESSAGE (OUTPATIENT)
Dept: INTERNAL MEDICINE | Facility: CLINIC | Age: 36
End: 2021-07-20

## 2021-07-20 RX ORDER — SERTRALINE HYDROCHLORIDE 25 MG/1
25 TABLET, FILM COATED ORAL DAILY
Qty: 90 TABLET | Refills: 1 | Status: SHIPPED | OUTPATIENT
Start: 2021-07-20 | End: 2021-09-07 | Stop reason: SDUPTHER

## 2021-07-20 NOTE — TELEPHONE ENCOUNTER
From: Jazzy CEDENO Click  To: GEE Leung  Sent: 7/20/2021 11:29 AM EDT  Subject: Prescription Question    Hi,  I didn’t end up starting the Wellbutrin. I reduced my dose of Sertraline to 25mg and I have been doing well. Can this be refilled and mailed to me from the [facility] pharmacy?    Thanks!  Jazzy Negrete

## 2021-09-07 ENCOUNTER — PATIENT MESSAGE (OUTPATIENT)
Dept: INTERNAL MEDICINE | Facility: CLINIC | Age: 36
End: 2021-09-07

## 2021-09-07 RX ORDER — PANTOPRAZOLE SODIUM 40 MG/1
40 TABLET, DELAYED RELEASE ORAL DAILY
Qty: 90 TABLET | Refills: 1 | Status: SHIPPED | OUTPATIENT
Start: 2021-09-07 | End: 2022-02-11 | Stop reason: SDUPTHER

## 2021-09-07 RX ORDER — SERTRALINE HYDROCHLORIDE 25 MG/1
50 TABLET, FILM COATED ORAL DAILY
Qty: 120 TABLET | Refills: 1 | Status: SHIPPED | OUTPATIENT
Start: 2021-09-07 | End: 2022-02-11 | Stop reason: SDUPTHER

## 2021-09-07 NOTE — TELEPHONE ENCOUNTER
From: Jazzy CEDENO Click  To: GEE Leung  Sent: 9/7/2021 8:59 AM EDT  Subject: Non-Urgent Medical Question    Good Morning!  I have an appt scheduled for 9/23. I am having horrible GERD. Can I get a refill on Protonix until my appointment? I have also been taking 50mg of my Sertraline so I am about to run out of my script. I am sorry for all of the script requests. If you have any questions, please let me know.    Thanks!  Jazzy

## 2021-10-05 ENCOUNTER — OFFICE VISIT (OUTPATIENT)
Dept: OBSTETRICS AND GYNECOLOGY | Facility: CLINIC | Age: 36
End: 2021-10-05

## 2021-10-05 VITALS
WEIGHT: 152.6 LBS | DIASTOLIC BLOOD PRESSURE: 72 MMHG | SYSTOLIC BLOOD PRESSURE: 106 MMHG | BODY MASS INDEX: 25.43 KG/M2 | HEIGHT: 65 IN

## 2021-10-05 DIAGNOSIS — Z01.419 WOMEN'S ANNUAL ROUTINE GYNECOLOGICAL EXAMINATION: Primary | ICD-10-CM

## 2021-10-05 DIAGNOSIS — Z30.431 IUD CHECK UP: ICD-10-CM

## 2021-10-05 PROCEDURE — 99395 PREV VISIT EST AGE 18-39: CPT | Performed by: NURSE PRACTITIONER

## 2021-10-05 NOTE — PROGRESS NOTES
GYN Annual Exam     CC - Here for annual exam.        DIDI  Jazzy Negrete is a 36 y.o. female, , who presents for annual well woman exam.  She has occasional scant spotting due to Mirena. Dysmenorrhea:none.  Patient reports problems with: none.  Since her last visit the patient underwent surgery for cardiac ablation in 2018.  Patient lived in [place] for while, and had second baby there. Partner Status: Marital Status: .  She is sexually active. She has not had new partners since her last STD testing. She does not desire STD testing. .    Additional OB/GYN History   Current contraception: contraceptive methods: IUD.  Insertion date: 2018- Mirena  Desires to: continue contraception  Last Pap : 2019- normal- completed in [place]  Last Completed Pap Smear          Ordered - PAP SMEAR (Every 3 Years) Ordered on 10/5/2021    2019  Patient-Reported (Performed Externally)              History of abnormal Pap smear: yes - h/o   Family history of uterine, colon, breast, or ovarian cancer: no  Performs monthly Self-Breast Exam: yes  Exercises Regularly:yes  Feelings of Anxiety or Depression: yes - managed with medication  Tobacco Usage?: No   OB History        5    Para   3    Term   2       1    AB   2    Living   2       SAB        TAB        Ectopic        Molar        Multiple        Live Births                    Health Maintenance   Topic Date Due   • Annual Gynecologic Pelvic and Breast Exam  Never done   • INFLUENZA VACCINE  2021   • ANNUAL PHYSICAL  2022   • PAP SMEAR  2022   • TDAP/TD VACCINES (2 - Td or Tdap) 2028   • HEPATITIS C SCREENING  Completed   • COVID-19 Vaccine  Completed   • Pneumococcal Vaccine 0-64  Aged Out       The additional following portions of the patient's history were reviewed and updated as appropriate: allergies, current medications, past family history, past medical history, past social history, past surgical history  "and problem list.    Review of Systems   Constitutional: Negative.    HENT: Negative.    Eyes: Negative.    Respiratory: Negative.    Cardiovascular: Negative.    Gastrointestinal: Negative.    Endocrine: Negative.    Genitourinary: Negative.    Musculoskeletal: Negative.    Skin: Negative.    Allergic/Immunologic: Negative.    Neurological: Negative.    Hematological: Negative.    Psychiatric/Behavioral: Positive for depressed mood (managed with medication). The patient is nervous/anxious (managed with medication).          I have reviewed and agree with the HPI, ROS, and historical information as entered above. Chely Tarango Nedra, APRN    Objective   /72   Ht 165.1 cm (65\")   Wt 69.2 kg (152 lb 9.6 oz)   LMP  (LMP Unknown)   Breastfeeding No   BMI 25.39 kg/m²     Physical Exam  Vitals and nursing note reviewed. Exam conducted with a chaperone present.   Constitutional:       General: She is not in acute distress.     Appearance: She is well-developed and normal weight. She is not ill-appearing.   HENT:      Head: Normocephalic and atraumatic.   Neck:      Thyroid: No thyroid mass or thyromegaly.   Pulmonary:      Effort: Pulmonary effort is normal. No retractions.   Chest:      Chest wall: No mass.      Breasts:         Right: Normal. No mass, nipple discharge, skin change or tenderness.         Left: Normal. No mass, nipple discharge, skin change or tenderness.   Abdominal:      Palpations: Abdomen is soft. Abdomen is not rigid. There is no mass.      Tenderness: There is no abdominal tenderness. There is no guarding.      Hernia: No hernia is present. There is no hernia in the left inguinal area.   Genitourinary:     General: Normal vulva.      Labia:         Right: No rash, tenderness or lesion.         Left: No rash, tenderness or lesion.       Vagina: Normal. No vaginal discharge or lesions.      Cervix: Normal.      Uterus: Normal. Not enlarged, not fixed and not tender.       Adnexa: Right adnexa " normal and left adnexa normal.        Right: No mass or tenderness.          Left: No mass or tenderness.        Rectum: No external hemorrhoid.      Comments: IUD strings present  Musculoskeletal:      Cervical back: Normal range of motion. No muscular tenderness.   Skin:     General: Skin is warm and dry.   Neurological:      Mental Status: She is alert and oriented to person, place, and time.   Psychiatric:         Mood and Affect: Mood normal.         Behavior: Behavior normal.            Assessment and Plan    Problem List Items Addressed This Visit     None      Visit Diagnoses     Women's annual routine gynecological examination    -  Primary    Relevant Orders    Pap IG, HPV-hr    IUD check up              1. GYN annual well woman exam.   2. Reviewed monthly self breast exams.  Instructed to call with lumps, pain, or breast discharge.    3. Reviewed exercise as a preventative health measures.   4. Reccommended Flu Vaccine in Fall of each year.  5. RTC in 1 year or PRN with problems  Return in about 1 year (around 10/5/2022) for Annual physical.         Chely Sneed, APRN  10/05/2021

## 2021-10-12 DIAGNOSIS — Z01.419 WOMEN'S ANNUAL ROUTINE GYNECOLOGICAL EXAMINATION: ICD-10-CM

## 2021-10-20 ENCOUNTER — TELEMEDICINE (OUTPATIENT)
Dept: INTERNAL MEDICINE | Facility: CLINIC | Age: 36
End: 2021-10-20

## 2021-10-20 DIAGNOSIS — M79.662 PAIN OF LEFT CALF: Primary | ICD-10-CM

## 2021-10-20 PROCEDURE — 99213 OFFICE O/P EST LOW 20 MIN: CPT | Performed by: NURSE PRACTITIONER

## 2021-11-09 ENCOUNTER — LAB (OUTPATIENT)
Dept: LAB | Facility: HOSPITAL | Age: 36
End: 2021-11-09

## 2021-11-09 DIAGNOSIS — M79.662 PAIN OF LEFT CALF: ICD-10-CM

## 2021-11-09 LAB
ALBUMIN SERPL-MCNC: 4.7 G/DL (ref 3.5–5.2)
ALBUMIN/GLOB SERPL: 2.1 G/DL
ALP SERPL-CCNC: 49 U/L (ref 39–117)
ALT SERPL W P-5'-P-CCNC: 19 U/L (ref 1–33)
ANION GAP SERPL CALCULATED.3IONS-SCNC: 7.7 MMOL/L (ref 5–15)
AST SERPL-CCNC: 23 U/L (ref 1–32)
BILIRUB SERPL-MCNC: 0.3 MG/DL (ref 0–1.2)
BUN SERPL-MCNC: 11 MG/DL (ref 6–20)
BUN/CREAT SERPL: 14.7 (ref 7–25)
CALCIUM SPEC-SCNC: 9.1 MG/DL (ref 8.6–10.5)
CHLORIDE SERPL-SCNC: 106 MMOL/L (ref 98–107)
CO2 SERPL-SCNC: 27.3 MMOL/L (ref 22–29)
CREAT SERPL-MCNC: 0.75 MG/DL (ref 0.57–1)
GFR SERPL CREATININE-BSD FRML MDRD: 88 ML/MIN/1.73
GLOBULIN UR ELPH-MCNC: 2.2 GM/DL
GLUCOSE SERPL-MCNC: 96 MG/DL (ref 65–99)
MAGNESIUM SERPL-MCNC: 1.9 MG/DL (ref 1.6–2.6)
POTASSIUM SERPL-SCNC: 4.1 MMOL/L (ref 3.5–5.2)
PROT SERPL-MCNC: 6.9 G/DL (ref 6–8.5)
SODIUM SERPL-SCNC: 141 MMOL/L (ref 136–145)

## 2021-11-09 PROCEDURE — 80053 COMPREHEN METABOLIC PANEL: CPT

## 2021-11-09 PROCEDURE — 83735 ASSAY OF MAGNESIUM: CPT

## 2021-11-09 PROCEDURE — 36415 COLL VENOUS BLD VENIPUNCTURE: CPT

## 2021-11-10 ENCOUNTER — TREATMENT (OUTPATIENT)
Dept: PHYSICAL THERAPY | Facility: CLINIC | Age: 36
End: 2021-11-10

## 2021-11-10 DIAGNOSIS — M79.662 PAIN OF LEFT CALF: Primary | ICD-10-CM

## 2021-11-10 DIAGNOSIS — M54.16 RADICULOPATHY, LUMBAR REGION: ICD-10-CM

## 2021-11-10 PROCEDURE — 97110 THERAPEUTIC EXERCISES: CPT | Performed by: PHYSICAL THERAPIST

## 2021-11-10 PROCEDURE — 97161 PT EVAL LOW COMPLEX 20 MIN: CPT | Performed by: PHYSICAL THERAPIST

## 2021-11-10 NOTE — PROGRESS NOTES
Physical Therapy Initial Evaluation and Plan of Care      Subjective Evaluation    History of Present Illness  Mechanism of injury: Pt is a 36 year old female presenting to the clinic with left calf pain. She has been training for a marathon in December. She was doing fine, but when she started running more miles she started having burning in her calf. She is also having some back and glut pain as well. She will start to feel the pain when she begins to run, then it will go away after 2-3 miles, then around mile 10 it will get much worse to the point she almost feels like she has to stop. She at times has numbness in both of her buttocks. She was wearing Asics, but has switched to sauconys. She thinks she herniated a disc around 3 years ago. Since then she has a hard time sitting and laying on the floor. She can feel the pain while walking as well.      Patient Occupation: Nurse practicioner Quality of life: excellent    Pain  Current pain ratin  At worst pain ratin  Quality: burning  Aggravating factors: ambulation  Progression: no change    Patient Goals  Patient goals for therapy: decreased pain, increased motion, increased strength, independence with ADLs/IADLs and return to sport/leisure activities             Objective          Palpation   Left   Tenderness of the lateral gastrocnemius and medial gastrocnemius.     Active Range of Motion     Lumbar   Flexion: WFL  Extension: WFL  Left lateral flexion: WFL  Right lateral flexion: WFL    Additional Active Range of Motion Details  Repeated movements do not reproduce pain.    Strength/Myotome Testing     Left Hip   Planes of Motion   Flexion: 5  Abduction: 5    Right Hip   Planes of Motion   Flexion: 5  Abduction: 5    Left Knee   Flexion: 5  Extension: 5    Right Knee   Flexion: 5  Extension: 5    Left Ankle/Foot   Dorsiflexion: 5  Plantar flexion: 5    Right Ankle/Foot   Dorsiflexion: 5  Plantar flexion: 5    Additional Strength Details  Single leg calf  raises immediately reproduce symptoms in the calf.    Tests       Thoracic   Negative slump.     Lumbar     Left   Positive passive SLR.   Negative quadrant.     Right   Negative quadrant.     Additional Tests Details  SLR mildly reproduces symptoms down the leg.          Assessment & Plan     Assessment  Impairments: abnormal muscle tone, activity intolerance, impaired physical strength, lacks appropriate home exercise program and pain with function  Assessment details: Pt is a 36 year old female presenting to the clinic with left calf pain. She is also having some low back pain with radicular symptoms. It is difficult to discern if her calf pain is related to the low back pain or not. She does have a mild pain in the calf with SLR on the left. She has some tenderness locally with PA's to L4/L5 and L5/S1, but no radicular symptoms. Although symptoms were not definitively reproduced, pt would benefit from skilled PT services in order to work on strength and mobility of the left calf, as well as core stabilization, in order to reach her long term goal of running a marathon next month.  Prognosis: good  Functional Limitations: walking  Goals  Plan Goals: STG (2 weeks)  1. Pt to report worst pain at 2/10 with running.  2. Pt to demonstrate 10 single leg calf raises without pain in order to demonstrates increased strength of the gastrocs.    LTG (3 weeks)  1. Pt to report running >10 miles with 0/10 pain in the calf.   2. Pt to demonstrates negative SLR to indicate decreased neural tension.    Plan  Therapy options: will be seen for skilled physical therapy services  Planned modality interventions: dry needling, cryotherapy, electrical stimulation/Russian stimulation, high voltage pulsed current (pain management), microcurrent electrical stimulation, TENS and thermotherapy (hydrocollator packs)  Planned therapy interventions: abdominal trunk stabilization, balance/weight-bearing training, body mechanics training, fine  motor coordination training, flexibility, functional ROM exercises, gait training, joint mobilization, home exercise program, manual therapy, neuromuscular re-education, soft tissue mobilization, spinal/joint mobilization, strengthening, stretching, therapeutic activities and postural training  Duration in visits: 1  Duration in weeks: 8  Treatment plan discussed with: patient        Manual Therapy:         mins  40439;  Therapeutic Exercise:    15     mins  05415;     Neuromuscular Edita:        mins  43240;    Therapeutic Activity:          mins  73366;     Gait Training:           mins  21025;     Ultrasound:          mins  45574;    Electrical Stimulation:         mins  25231 ( );  Dry Needling     20     mins self-pay (NO CHARGE)    Timed Treatment:   15   mins   Total Treatment:     60   mins    PT SIGNATURE: Stacy Lozada, PT   DATE TREATMENT INITIATED: 11/10/2021    Initial Certification  Certification Period: 11/10/2021 thru 2/7/2022  I certify that the therapy services are furnished while this patient is under my care.  The services outlined above are required by this patient, and will be reviewed every 90 days.     PHYSICIAN: Ang Whitehead APRN      DATE:     Please sign and return via fax to [phone].. Thank you, [facility].

## 2021-11-17 ENCOUNTER — TREATMENT (OUTPATIENT)
Dept: PHYSICAL THERAPY | Facility: CLINIC | Age: 36
End: 2021-11-17

## 2021-11-17 DIAGNOSIS — M54.16 RADICULOPATHY, LUMBAR REGION: ICD-10-CM

## 2021-11-17 DIAGNOSIS — M79.662 PAIN OF LEFT CALF: Primary | ICD-10-CM

## 2021-11-17 PROCEDURE — 97112 NEUROMUSCULAR REEDUCATION: CPT | Performed by: PHYSICAL THERAPIST

## 2021-11-17 PROCEDURE — 97140 MANUAL THERAPY 1/> REGIONS: CPT | Performed by: PHYSICAL THERAPIST

## 2021-11-17 PROCEDURE — 97110 THERAPEUTIC EXERCISES: CPT | Performed by: PHYSICAL THERAPIST

## 2021-11-17 NOTE — PROGRESS NOTES
Physical Therapy Daily Progress Note    Subjective   Jazzy Click reports: she has not noticed much change since last visit. The dry needling did not seem to make a difference. She continues to have a lot of pain the first 3 miles of her runs.      Objective   See Exercise, Manual, and Modality Logs for complete treatment.       Assessment/Plan   Pt tolerated treatment well. She was educated on an updated HEP to include more core and glut strength. Pt would benefit from continued skilled PT.    Progress per Plan of Care           Manual Therapy:    23     mins  70337;  Therapeutic Exercise:    15     mins  70541;     Neuromuscular Edita:    18    mins  18276;    Therapeutic Activity:          mins  24773;     Gait Training:           mins  89527;     Ultrasound:          mins  93431;    Electrical Stimulation:         mins  46769 ( );  E-Stim Attended:         mins  99667  Iontophoresis          mins 51129   Traction          mins  91648  Fluidotherapy          mins  70030  Dry Needling          mins self-pay - No Charge  Paraffin          mins  47929    Timed Treatment:   56   mins   Total Treatment:     56   mins    Stacy Lozada, PT, DPT  Physical Therapist

## 2021-11-23 ENCOUNTER — TREATMENT (OUTPATIENT)
Dept: PHYSICAL THERAPY | Facility: CLINIC | Age: 36
End: 2021-11-23

## 2021-11-23 DIAGNOSIS — M79.662 PAIN OF LEFT CALF: Primary | ICD-10-CM

## 2021-11-23 DIAGNOSIS — M54.16 RADICULOPATHY, LUMBAR REGION: ICD-10-CM

## 2021-11-23 PROCEDURE — 20561 NDL INSJ W/O NJX 3+ MUSC: CPT | Performed by: PHYSICAL THERAPIST

## 2021-11-23 PROCEDURE — 97110 THERAPEUTIC EXERCISES: CPT | Performed by: PHYSICAL THERAPIST

## 2021-11-23 PROCEDURE — 97140 MANUAL THERAPY 1/> REGIONS: CPT | Performed by: PHYSICAL THERAPIST

## 2021-11-23 NOTE — PROGRESS NOTES
Physical Therapy Daily Progress Note    Subjective   Jazzy Click reports: she is doing a little better since last time. She went on a long run yesterday and her calf did not hurt as bad. Her back is feeling good. The left side of her neck is hurting really bad and causing her to have a headache.      Objective   See Exercise, Manual, and Modality Logs for complete treatment.       Assessment/Plan   Pt tolerated treatment well. She did not have as much hypomobility in her lumbar spine this visit. Dry needling was performed with no adverse effects. Pt would benefit from continued skilled PT.    Progress per Plan of Care           Manual Therapy:    23     mins  48683;  Therapeutic Exercise:    18     mins  18348;     Neuromuscular Edita:        mins  28560;    Therapeutic Activity:          mins  71343;     Gait Training:           mins  10327;     Ultrasound:          mins  05471;    Electrical Stimulation:         mins  50784 ( );  E-Stim Attended:         mins  38555  Iontophoresis          mins 54346   Traction          mins  28103  Fluidotherapy          mins  83959  Dry Needling     20     mins self-pay - No Charge  Paraffin          mins  94969    Timed Treatment:   41   mins   Total Treatment:     61   mins    Stacy Lozada, PT, DPT  Physical Therapist

## 2021-11-30 ENCOUNTER — TREATMENT (OUTPATIENT)
Dept: PHYSICAL THERAPY | Facility: CLINIC | Age: 36
End: 2021-11-30

## 2021-11-30 DIAGNOSIS — M79.662 PAIN OF LEFT CALF: Primary | ICD-10-CM

## 2021-11-30 DIAGNOSIS — M54.16 RADICULOPATHY, LUMBAR REGION: ICD-10-CM

## 2021-11-30 PROCEDURE — 97112 NEUROMUSCULAR REEDUCATION: CPT | Performed by: PHYSICAL THERAPIST

## 2021-11-30 PROCEDURE — 97110 THERAPEUTIC EXERCISES: CPT | Performed by: PHYSICAL THERAPIST

## 2021-11-30 PROCEDURE — 97140 MANUAL THERAPY 1/> REGIONS: CPT | Performed by: PHYSICAL THERAPIST

## 2021-12-10 ENCOUNTER — CLINICAL SUPPORT (OUTPATIENT)
Dept: FAMILY MEDICINE CLINIC | Facility: CLINIC | Age: 36
End: 2021-12-10

## 2021-12-10 DIAGNOSIS — Z23 IMMUNIZATION DUE: Primary | ICD-10-CM

## 2021-12-10 PROCEDURE — 0001A COVID-19 (PFIZER): CPT | Performed by: FAMILY MEDICINE

## 2021-12-10 PROCEDURE — 91300 COVID-19 (PFIZER): CPT | Performed by: FAMILY MEDICINE

## 2022-02-11 ENCOUNTER — OFFICE VISIT (OUTPATIENT)
Dept: INTERNAL MEDICINE | Facility: CLINIC | Age: 37
End: 2022-02-11

## 2022-02-11 ENCOUNTER — LAB (OUTPATIENT)
Dept: LAB | Facility: HOSPITAL | Age: 37
End: 2022-02-11

## 2022-02-11 VITALS
OXYGEN SATURATION: 97 % | WEIGHT: 157 LBS | BODY MASS INDEX: 26.16 KG/M2 | TEMPERATURE: 97 F | HEIGHT: 65 IN | HEART RATE: 68 BPM | SYSTOLIC BLOOD PRESSURE: 110 MMHG | DIASTOLIC BLOOD PRESSURE: 70 MMHG

## 2022-02-11 DIAGNOSIS — Z00.00 HEALTHCARE MAINTENANCE: Primary | ICD-10-CM

## 2022-02-11 DIAGNOSIS — L70.0 ACNE VULGARIS: ICD-10-CM

## 2022-02-11 DIAGNOSIS — Z00.00 HEALTHCARE MAINTENANCE: ICD-10-CM

## 2022-02-11 DIAGNOSIS — F41.9 ANXIETY: ICD-10-CM

## 2022-02-11 DIAGNOSIS — K21.9 GASTROESOPHAGEAL REFLUX DISEASE, UNSPECIFIED WHETHER ESOPHAGITIS PRESENT: ICD-10-CM

## 2022-02-11 LAB
DEPRECATED RDW RBC AUTO: 37.5 FL (ref 37–54)
ERYTHROCYTE [DISTWIDTH] IN BLOOD BY AUTOMATED COUNT: 11.8 % (ref 12.3–15.4)
HCT VFR BLD AUTO: 42.5 % (ref 34–46.6)
HGB BLD-MCNC: 14.5 G/DL (ref 12–15.9)
MCH RBC QN AUTO: 30 PG (ref 26.6–33)
MCHC RBC AUTO-ENTMCNC: 34.1 G/DL (ref 31.5–35.7)
MCV RBC AUTO: 87.8 FL (ref 79–97)
PLATELET # BLD AUTO: 287 10*3/MM3 (ref 140–450)
PMV BLD AUTO: 10.5 FL (ref 6–12)
RBC # BLD AUTO: 4.84 10*6/MM3 (ref 3.77–5.28)
WBC NRBC COR # BLD: 5.61 10*3/MM3 (ref 3.4–10.8)

## 2022-02-11 PROCEDURE — 80050 GENERAL HEALTH PANEL: CPT

## 2022-02-11 PROCEDURE — 99395 PREV VISIT EST AGE 18-39: CPT | Performed by: STUDENT IN AN ORGANIZED HEALTH CARE EDUCATION/TRAINING PROGRAM

## 2022-02-11 PROCEDURE — 80061 LIPID PANEL: CPT

## 2022-02-11 PROCEDURE — 36415 COLL VENOUS BLD VENIPUNCTURE: CPT

## 2022-02-11 RX ORDER — PANTOPRAZOLE SODIUM 40 MG/1
40 TABLET, DELAYED RELEASE ORAL DAILY
Qty: 90 TABLET | Refills: 3 | Status: SHIPPED | OUTPATIENT
Start: 2022-02-11 | End: 2022-04-20

## 2022-02-11 RX ORDER — CLINDAMYCIN PHOSPHATE 10 MG/G
1 GEL TOPICAL 2 TIMES DAILY
Qty: 60 G | Refills: 3 | Status: SHIPPED | OUTPATIENT
Start: 2022-02-11 | End: 2023-02-17

## 2022-02-11 NOTE — PROGRESS NOTES
Internal Medicine Physical Note      Date: 2022     Patient Name: Jazzy Negrete  : 1985   MRN: [id number]     Chief Complaint:    Chief Complaint   Patient presents with   • Establish Care   • Anxiety       History of Present Illness: Jazzy Negrete is a 36 y.o. female who is here today for her annual health maintenance exam and physical. She has no acute complaints or concerns today.     General Health: She describes her health as good.  She has been doing well over the last year with no falls, hospitalizations, or ER visits. She has had no surgeries or changes in medical history over the last year.  She is not up-to-date with eye but has had Lasik but she is UTD dental exams. She describes her mood as better since switching jobs. She is up-to-date on immunizations. Her last period was prior to getting her IUD. .     Lifestyle: She eats a diverse and healthy diet and has been cooking more at home and exercises by running, completing a marathon in 2021. She does not use tobacco products, alcohol, or illicit drugs.      Subjective     Past Medical History:   Past Medical History:   Diagnosis Date   • Abnormal ECG    • Abnormal Pap smear of cervix    • Anxiety    • Cervical dysplasia    • Depression    • Gestational hypertension 2015   • History of IUFD 2014    Genetic testing consistent with trisomy 21   • HPV (human papilloma virus) infection    • Recurrent pregnancy loss, antepartum condition or complication    • SVT (supraventricular tachycardia) (HCC)    • Threatened miscarriage 2014   • Varicella        Past Surgical History:   Past Surgical History:   Procedure Laterality Date   • CARDIAC ELECTROPHYSIOLOGY PROCEDURE     • DILATATION AND CURETTAGE  2014    MAB @ 10 weeks   • LASIK     • WISDOM TOOTH EXTRACTION         Family History:   Family History   Problem Relation Age of Onset   • Lupus Mother    • Hypertension Mother    • Hyperlipidemia Mother  "   • Arthritis Mother    • Diabetes Father    • Hypertension Father    • Hyperlipidemia Father    • Heart disease Father    • Celiac disease Brother    • Ulcerative colitis Brother    • Hyperlipidemia Paternal Grandfather    • Hypertension Paternal Grandfather    • Colon cancer Maternal Great-Grandmother        Social History:   Social History     Socioeconomic History   • Marital status:    Tobacco Use   • Smoking status: Never Smoker   • Smokeless tobacco: Never Used   Substance and Sexual Activity   • Alcohol use: Not Currently     Alcohol/week: 0.0 standard drinks     Comment: Socially   • Drug use: Never   • Sexual activity: Yes     Partners: Male     Birth control/protection: I.U.D.       Allergies:   No Known Allergies    Depression: PHQ-2 Depression Screening  Little interest or pleasure in doing things? 0   Feeling down, depressed, or hopeless? 0   PHQ-2 Total Score 0       Objective     Physical Exam:  Vital Signs:   Vitals:    02/11/22 0918   BP: 110/70   Pulse: 68   Temp: 97 °F (36.1 °C)   SpO2: 97%   Weight: 71.2 kg (157 lb)   Height: 165.1 cm (65\")   PainSc: 0-No pain     Body mass index is 26.13 kg/m².     Physical Exam  Vitals and nursing note reviewed. Exam conducted with a chaperone present.   Constitutional:       General: She is not in acute distress.     Appearance: Normal appearance.   HENT:      Head: Normocephalic and atraumatic.      Right Ear: Tympanic membrane, ear canal and external ear normal. There is no impacted cerumen.      Left Ear: Tympanic membrane, ear canal and external ear normal. There is no impacted cerumen.   Cardiovascular:      Rate and Rhythm: Normal rate and regular rhythm.      Pulses: Normal pulses.      Heart sounds: Normal heart sounds. No murmur heard.      Pulmonary:      Effort: Pulmonary effort is normal. No respiratory distress.      Breath sounds: Normal breath sounds. No wheezing, rhonchi or rales.   Abdominal:      General: Abdomen is flat. Bowel " sounds are normal.      Palpations: Abdomen is soft.   Musculoskeletal:         General: No swelling. Normal range of motion.      Cervical back: Normal range of motion and neck supple.      Right lower leg: No edema.      Left lower leg: No edema.   Lymphadenopathy:      Cervical: No cervical adenopathy.   Skin:     General: Skin is warm and dry.      Findings: No rash.   Neurological:      General: No focal deficit present.      Mental Status: She is alert and oriented to person, place, and time.      Gait: Gait normal.   Psychiatric:         Mood and Affect: Mood normal.         Behavior: Behavior normal.         Assessment / Plan      Assessment/Plan:   Diagnoses and all orders for this visit:    1. Healthcare maintenance (Primary)  - Cervical Cancer Screening / Pap Smear: UTD  - Breast Cancer Screening / Mammogram: Due age 40-50  - Colon Cancer Screening / Colonoscopy:  Due at age 45. Maternal great-grandmother was diagnosed with colon cancer in her 70s. Father has polyps.   - HCV Screening: Non-reactive  - Immunizations: UTD  - Cardiovascular Health Screening / ASCVD Risk:  Recommended 150 minutes of moderate intensity physical activity each week and a diet rich in vegetables and unsaturated fat while avoiding saturated fats and processed foods as able.   - Depression Screening: PHQ2 - 0  - Advice and education was given regarding routine dental evaluations, routine eye exams, reproductive health, cardiovascular risk reduction, sunscreen use, self skin examination (annual dermatology evaluations) and seat belt use (general overall safety).  Further recommendations after lab evaluation.  Annual wellness evaluations recommended.   -     Lipid panel; Future  -     TSH Rfx On Abnormal To Free T4; Future  -     Comprehensive metabolic panel; Future  -     CBC (No Diff); Future    2. Anxiety  Chronic, stable. Refill of sertraline 50 mg provided.     3. Gastroesophageal reflux disease, unspecified whether esophagitis  present  Uncontrolled despite conservative measures. Continue Pantoprazole 40 mg daily. Refill provided. GI referral placed.     4. Acne   Due to mask. Stop metronidazole gel as this has not improved acne. Prescribing Clinda gel and benzoyl peroxide to apply to areas of acne BID.     Follow Up:   Return in about 1 year (around 2/11/2023) for Annual.    Time:   I spent approximately 30 minutes providing clinical care for this patient; including review of patient's chart and provider documentation, face to face time spent with patient in examination room (obtaining history, performing physical exam, discussing diagnosis and management options), placing orders, and completing patient documentation.     Mery Casas MD  [facility] – [facility]

## 2022-02-11 NOTE — PATIENT INSTRUCTIONS
Health Maintenance, Female  Adopting a healthy lifestyle and getting preventive care can go a long way to promote health and wellness. Talk with your health care provider about what schedule of regular examinations is right for you. This is a good chance for you to check in with your provider about disease prevention and staying healthy.  In between checkups, there are plenty of things you can do on your own. Experts have done a lot of research about which lifestyle changes and preventive measures are most likely to keep you healthy. Ask your health care provider for more information.  Weight and diet  Eat a healthy diet  · Be sure to include plenty of vegetables, fruits, low-fat dairy products, and lean protein.  · Do not eat a lot of foods high in solid fats, added sugars, or salt.  · Get regular exercise. This is one of the most important things you can do for your health.  ? Most adults should exercise for at least 150 minutes each week. The exercise should increase your heart rate and make you sweat (moderate-intensity exercise).  ? Most adults should also do strengthening exercises at least twice a week. This is in addition to the moderate-intensity exercise.     Maintain a healthy weight  · Body mass index (BMI) is a measurement that can be used to identify possible weight problems. It estimates body fat based on height and weight. Your health care provider can help determine your BMI and help you achieve or maintain a healthy weight.  · For females 20 years of age and older:  ? A BMI below 18.5 is considered underweight.  ? A BMI of 18.5 to 24.9 is normal.  ? A BMI of 25 to 29.9 is considered overweight.  ? A BMI of 30 and above is considered obese.     Watch levels of cholesterol and blood lipids  · You should start having your blood tested for lipids and cholesterol at 20 years of age, then have this test every 5 years.  · You may need to have your cholesterol levels checked more often if:  ? Your lipid or  cholesterol levels are high.  ? You are older than 50 years of age.  ? You are at high risk for heart disease.     Cancer screening  Lung Cancer  · Lung cancer screening is recommended for adults 55-80 years old who are at high risk for lung cancer because of a history of smoking.  · A yearly low-dose CT scan of the lungs is recommended for people who:  ? Currently smoke.  ? Have quit within the past 15 years.  ? Have at least a 30-pack-year history of smoking. A pack year is smoking an average of one pack of cigarettes a day for 1 year.  · Yearly screening should continue until it has been 15 years since you quit.  · Yearly screening should stop if you develop a health problem that would prevent you from having lung cancer treatment.     Breast Cancer  · Practice breast self-awareness. This means understanding how your breasts normally appear and feel.  · It also means doing regular breast self-exams. Let your health care provider know about any changes, no matter how small.  · If you are in your 20s or 30s, you should have a clinical breast exam (CBE) by a health care provider every 1-3 years as part of a regular health exam.  · If you are 40 or older, have a CBE every year. Also consider having a breast X-ray (mammogram) every year.  · If you have a family history of breast cancer, talk to your health care provider about genetic screening.  · If you are at high risk for breast cancer, talk to your health care provider about having an MRI and a mammogram every year.  · Breast cancer gene (BRCA) assessment is recommended for women who have family members with BRCA-related cancers. BRCA-related cancers include:  ? Breast.  ? Ovarian.  ? Tubal.  ? Peritoneal cancers.  · Results of the assessment will determine the need for genetic counseling and BRCA1 and BRCA2 testing.     Cervical Cancer  Your health care provider may recommend that you be screened regularly for cancer of the pelvic organs (ovaries, uterus, and  vagina). This screening involves a pelvic examination, including checking for microscopic changes to the surface of your cervix (Pap test). You may be encouraged to have this screening done every 3 years, beginning at age 21.  · For women ages 30-65, health care providers may recommend pelvic exams and Pap testing every 3 years, or they may recommend the Pap and pelvic exam, combined with testing for human papilloma virus (HPV), every 5 years. Some types of HPV increase your risk of cervical cancer. Testing for HPV may also be done on women of any age with unclear Pap test results.  · Other health care providers may not recommend any screening for nonpregnant women who are considered low risk for pelvic cancer and who do not have symptoms. Ask your health care provider if a screening pelvic exam is right for you.  · If you have had past treatment for cervical cancer or a condition that could lead to cancer, you need Pap tests and screening for cancer for at least 20 years after your treatment. If Pap tests have been discontinued, your risk factors (such as having a new sexual partner) need to be reassessed to determine if screening should resume. Some women have medical problems that increase the chance of getting cervical cancer. In these cases, your health care provider may recommend more frequent screening and Pap tests.     Colorectal Cancer  · This type of cancer can be detected and often prevented.  · Routine colorectal cancer screening usually begins at 50 years of age and continues through 75 years of age.  · Your health care provider may recommend screening at an earlier age if you have risk factors for colon cancer.  · Your health care provider may also recommend using home test kits to check for hidden blood in the stool.  · A small camera at the end of a tube can be used to examine your colon directly (sigmoidoscopy or colonoscopy). This is done to check for the earliest forms of colorectal  cancer.  · Routine screening usually begins at age 50.  · Direct examination of the colon should be repeated every 5-10 years through 75 years of age. However, you may need to be screened more often if early forms of precancerous polyps or small growths are found.     Skin Cancer  · Check your skin from head to toe regularly.  · Tell your health care provider about any new moles or changes in moles, especially if there is a change in a mole's shape or color.  · Also tell your health care provider if you have a mole that is larger than the size of a pencil eraser.  · Always use sunscreen. Apply sunscreen liberally and repeatedly throughout the day.  · Protect yourself by wearing long sleeves, pants, a wide-brimmed hat, and sunglasses whenever you are outside.     Heart disease, diabetes, and high blood pressure  · High blood pressure causes heart disease and increases the risk of stroke. High blood pressure is more likely to develop in:  ? People who have blood pressure in the high end of the normal range (130-139/85-89 mm Hg).  ? People who are overweight or obese.  ? People who are .  · If you are 18-39 years of age, have your blood pressure checked every 3-5 years. If you are 40 years of age or older, have your blood pressure checked every year. You should have your blood pressure measured twice--once when you are at a hospital or clinic, and once when you are not at a hospital or clinic. Record the average of the two measurements. To check your blood pressure when you are not at a hospital or clinic, you can use:  ? An automated blood pressure machine at a pharmacy.  ? A home blood pressure monitor.  · If you are between 55 years and 79 years old, ask your health care provider if you should take aspirin to prevent strokes.  · Have regular diabetes screenings. This involves taking a blood sample to check your fasting blood sugar level.  ? If you are at a normal weight and have a low risk for  diabetes, have this test once every three years after 45 years of age.  ? If you are overweight and have a high risk for diabetes, consider being tested at a younger age or more often.  Preventing infection  Hepatitis B  · If you have a higher risk for hepatitis B, you should be screened for this virus. You are considered at high risk for hepatitis B if:  ? You were born in a country where hepatitis B is common. Ask your health care provider which countries are considered high risk.  ? Your parents were born in a high-risk country, and you have not been immunized against hepatitis B (hepatitis B vaccine).  ? You have HIV or AIDS.  ? You use needles to inject street drugs.  ? You live with someone who has hepatitis B.  ? You have had sex with someone who has hepatitis B.  ? You get hemodialysis treatment.  ? You take certain medicines for conditions, including cancer, organ transplantation, and autoimmune conditions.     Hepatitis C  · Blood testing is recommended for:  ? Everyone born from 1945 through 1965.  ? Anyone with known risk factors for hepatitis C.     Sexually transmitted infections (STIs)  · You should be screened for sexually transmitted infections (STIs) including gonorrhea and chlamydia if:  ? You are sexually active and are younger than 24 years of age.  ? You are older than 24 years of age and your health care provider tells you that you are at risk for this type of infection.  ? Your sexual activity has changed since you were last screened and you are at an increased risk for chlamydia or gonorrhea. Ask your health care provider if you are at risk.  · If you do not have HIV, but are at risk, it may be recommended that you take a prescription medicine daily to prevent HIV infection. This is called pre-exposure prophylaxis (PrEP). You are considered at risk if:  ? You are sexually active and do not regularly use condoms or know the HIV status of your partner(s).  ? You take drugs by injection.  ? You  are sexually active with a partner who has HIV.     Talk with your health care provider about whether you are at high risk of being infected with HIV. If you choose to begin PrEP, you should first be tested for HIV. You should then be tested every 3 months for as long as you are taking PrEP.  Pregnancy  · If you are premenopausal and you may become pregnant, ask your health care provider about preconception counseling.  · If you may become pregnant, take 400 to 800 micrograms (mcg) of folic acid every day.  · If you want to prevent pregnancy, talk to your health care provider about birth control (contraception).  Osteoporosis and menopause  · Osteoporosis is a disease in which the bones lose minerals and strength with aging. This can result in serious bone fractures. Your risk for osteoporosis can be identified using a bone density scan.  · If you are 65 years of age or older, or if you are at risk for osteoporosis and fractures, ask your health care provider if you should be screened.  · Ask your health care provider whether you should take a calcium or vitamin D supplement to lower your risk for osteoporosis.  · Menopause may have certain physical symptoms and risks.  · Hormone replacement therapy may reduce some of these symptoms and risks.  Talk to your health care provider about whether hormone replacement therapy is right for you.  Follow these instructions at home:  · Schedule regular health, dental, and eye exams.  · Stay current with your immunizations.  · Do not use any tobacco products including cigarettes, chewing tobacco, or electronic cigarettes.  · If you are pregnant, do not drink alcohol.  · If you are breastfeeding, limit how much and how often you drink alcohol.  · Limit alcohol intake to no more than 1 drink per day for nonpregnant women. One drink equals 12 ounces of beer, 5 ounces of wine, or 1½ ounces of hard liquor.  · Do not use street drugs.  · Do not share needles.  · Ask your health care  provider for help if you need support or information about quitting drugs.  · Tell your health care provider if you often feel depressed.  · Tell your health care provider if you have ever been abused or do not feel safe at home.  This information is not intended to replace advice given to you by your health care provider. Make sure you discuss any questions you have with your health care provider.  Document Released: 07/02/2012 Document Revised: 05/25/2017 Document Reviewed: 09/20/2016  [employer] Patient Education © 2018 [employer]

## 2022-02-12 LAB
ALBUMIN SERPL-MCNC: 4.7 G/DL (ref 3.5–5.2)
ALBUMIN/GLOB SERPL: 2 G/DL
ALP SERPL-CCNC: 49 U/L (ref 39–117)
ALT SERPL W P-5'-P-CCNC: 17 U/L (ref 1–33)
ANION GAP SERPL CALCULATED.3IONS-SCNC: 7.2 MMOL/L (ref 5–15)
AST SERPL-CCNC: 20 U/L (ref 1–32)
BILIRUB SERPL-MCNC: 0.4 MG/DL (ref 0–1.2)
BUN SERPL-MCNC: 14 MG/DL (ref 6–20)
BUN/CREAT SERPL: 16.1 (ref 7–25)
CALCIUM SPEC-SCNC: 9.1 MG/DL (ref 8.6–10.5)
CHLORIDE SERPL-SCNC: 103 MMOL/L (ref 98–107)
CHOLEST SERPL-MCNC: 162 MG/DL (ref 0–200)
CO2 SERPL-SCNC: 28.8 MMOL/L (ref 22–29)
CREAT SERPL-MCNC: 0.87 MG/DL (ref 0.57–1)
GFR SERPL CREATININE-BSD FRML MDRD: 74 ML/MIN/1.73
GLOBULIN UR ELPH-MCNC: 2.4 GM/DL
GLUCOSE SERPL-MCNC: 83 MG/DL (ref 65–99)
HDLC SERPL-MCNC: 49 MG/DL (ref 40–60)
LDLC SERPL CALC-MCNC: 104 MG/DL (ref 0–100)
LDLC/HDLC SERPL: 2.13 {RATIO}
POTASSIUM SERPL-SCNC: 4.1 MMOL/L (ref 3.5–5.2)
PROT SERPL-MCNC: 7.1 G/DL (ref 6–8.5)
SODIUM SERPL-SCNC: 139 MMOL/L (ref 136–145)
TRIGL SERPL-MCNC: 42 MG/DL (ref 0–150)
TSH SERPL DL<=0.05 MIU/L-ACNC: 1.5 UIU/ML (ref 0.27–4.2)
VLDLC SERPL-MCNC: 9 MG/DL (ref 5–40)

## 2022-03-04 ENCOUNTER — OUTSIDE FACILITY SERVICE (OUTPATIENT)
Dept: GASTROENTEROLOGY | Facility: CLINIC | Age: 37
End: 2022-03-04

## 2022-03-04 PROCEDURE — 88305 TISSUE EXAM BY PATHOLOGIST: CPT | Performed by: INTERNAL MEDICINE

## 2022-03-04 PROCEDURE — 43239 EGD BIOPSY SINGLE/MULTIPLE: CPT | Performed by: INTERNAL MEDICINE

## 2022-03-07 ENCOUNTER — LAB REQUISITION (OUTPATIENT)
Dept: LAB | Facility: HOSPITAL | Age: 37
End: 2022-03-07

## 2022-03-07 DIAGNOSIS — K21.00 GASTRO-ESOPHAGEAL REFLUX DISEASE WITH ESOPHAGITIS, WITHOUT BLEEDING: ICD-10-CM

## 2022-03-07 DIAGNOSIS — K44.9 DIAPHRAGMATIC HERNIA WITHOUT OBSTRUCTION OR GANGRENE: ICD-10-CM

## 2022-03-07 DIAGNOSIS — K21.9 GASTRO-ESOPHAGEAL REFLUX DISEASE WITHOUT ESOPHAGITIS: ICD-10-CM

## 2022-03-07 DIAGNOSIS — K22.89 OTHER SPECIFIED DISEASE OF ESOPHAGUS: ICD-10-CM

## 2022-03-08 ENCOUNTER — TELEPHONE (OUTPATIENT)
Dept: GASTROENTEROLOGY | Facility: CLINIC | Age: 37
End: 2022-03-08

## 2022-03-08 LAB
CYTO UR: NORMAL
LAB AP CASE REPORT: NORMAL
LAB AP CLINICAL INFORMATION: NORMAL
PATH REPORT.FINAL DX SPEC: NORMAL
PATH REPORT.GROSS SPEC: NORMAL

## 2022-03-08 NOTE — TELEPHONE ENCOUNTER
I spoke to Mrs. Negrete regarding the pathology.  There was some mild changes of reflux distally.  There was no evidence of Taylor's esophagus.  The pathology did not demonstrate any evidence for H. pylori.  She is actually taking an H2 blocker at this time and doing well.  We did discuss lifestyle modifications on a daily basis.  She will call if any changes occur in the future.

## 2022-04-20 ENCOUNTER — OFFICE VISIT (OUTPATIENT)
Dept: INTERNAL MEDICINE | Facility: CLINIC | Age: 37
End: 2022-04-20

## 2022-04-20 VITALS
TEMPERATURE: 97.1 F | WEIGHT: 151 LBS | HEIGHT: 65 IN | BODY MASS INDEX: 25.16 KG/M2 | DIASTOLIC BLOOD PRESSURE: 70 MMHG | OXYGEN SATURATION: 99 % | HEART RATE: 66 BPM | SYSTOLIC BLOOD PRESSURE: 112 MMHG

## 2022-04-20 DIAGNOSIS — M25.551 RIGHT HIP PAIN: Primary | ICD-10-CM

## 2022-04-20 PROCEDURE — 99213 OFFICE O/P EST LOW 20 MIN: CPT | Performed by: STUDENT IN AN ORGANIZED HEALTH CARE EDUCATION/TRAINING PROGRAM

## 2022-04-20 NOTE — PROGRESS NOTES
"              Internal Medicine Acute Visit     Patient Name: Jazzy Negrete  : 1985   MRN: [id number]     Chief Complaint:    Chief Complaint   Patient presents with   • Hip Pain     Right hip         History of Present Illness: Jazzy Negrete is a 36 y.o. female who presents for continued right hip pain. She ran a marathon in 2021 which is when pain began. No falls or other trauma. She reports the pain on the lateral side of her lower hip, upper leg. It is tender to palpation. No pain with extension but she does have pain with abduction (specifically notes pain when she is sitting in a butterfly stretch. She also noted pain when she lies on the right side. Denies anterior hip or posterior hip pain.     Subjective     I have reviewed and the following portions of the patient's history were updated as appropriate: past family history, past medical history, past social history, past surgical history and problem list.    Allergies:   No Known Allergies    Objective     Physical Exam:  Vital Signs:   Vitals:    22 0812   BP: 112/70   Pulse: 66   Temp: 97.1 °F (36.2 °C)   SpO2: 99%   Weight: 68.5 kg (151 lb)   Height: 165.1 cm (65\")   PainSc:   3     Body mass index is 25.13 kg/m².    Physical Exam  Vitals and nursing note reviewed.   Constitutional:       General: She is not in acute distress.     Appearance: Normal appearance. She is not ill-appearing or toxic-appearing.   HENT:      Head: Normocephalic and atraumatic.   Cardiovascular:      Rate and Rhythm: Normal rate.   Pulmonary:      Effort: Pulmonary effort is normal. No respiratory distress.   Musculoskeletal:         General: Tenderness (over right lateral upper leg lower hip area. ) present. No swelling. Normal range of motion.      Right lower leg: No edema.      Left lower leg: No edema.   Neurological:      Mental Status: She is alert.         Assessment / Plan      Assessment/Plan:   Diagnoses and all orders for this visit:    1. " Right hip pain (Primary)  Notes pain over right lateral leg/hip and has pain with abduction so this may be consistent with greater trochanteric pain syndrome though area of pain may be a little low for this. Referral to physical therapy for evaluation and treatment.     Follow Up:   Return for Next scheduled follow up. She is considering who she will transition care to and call to make the appointment.     Time:   I spent approximately 15 minutes providing clinical care for this patient; including review of patient's chart and provider documentation, face to face time spent with patient in examination room (obtaining history, performing physical exam, discussing diagnosis and management options), placing orders, and completing patient documentation.     Mery Casas MD  [facility]

## 2022-04-26 ENCOUNTER — TREATMENT (OUTPATIENT)
Dept: PHYSICAL THERAPY | Facility: CLINIC | Age: 37
End: 2022-04-26

## 2022-04-26 DIAGNOSIS — M25.551 RIGHT HIP PAIN: Primary | ICD-10-CM

## 2022-04-26 PROCEDURE — 97530 THERAPEUTIC ACTIVITIES: CPT | Performed by: PHYSICAL THERAPIST

## 2022-04-26 PROCEDURE — 97161 PT EVAL LOW COMPLEX 20 MIN: CPT | Performed by: PHYSICAL THERAPIST

## 2022-04-26 NOTE — PROGRESS NOTES
Physical Therapy Initial Evaluation and Plan of Care    Patient: Jazzy Negrete   : 1985  Diagnosis/ICD-10 Code:  Right hip pain [M25.551]  Referring practitioner: Mery Casas MD  Date of Initial Visit: 2022  Today's Date: 2022  Patient seen for 1 session         Visit Diagnoses:    ICD-10-CM ICD-9-CM   1. Right hip pain  M25.551 719.45         Subjective Questionnaire: LEFS: 70/80      Subjective Evaluation    History of Present Illness  Mechanism of injury: Pt developed R hip pn while training for a full marathon in  that significantly worsened during the race on 21. She cut back on running, focused on stretching, core strengthening, yoga but pn minimally improved. She reports continued intermittent pn localized to the lateral hip. Denies radiating pn, N/T, popping/clicking. Has some prexisting low back pn that she has done PT for in the past. Hip and back can hurt simultaneously at times. Pn minimal at rest, aggravated by laying on her R side, sitting crosslegged or with her ankle crossed over her opposite knee in fig 4 position, butterfly stretch position. Can tell R hip mobility is limited compared to her L. Has not had imaging. Referred by PCP.    Subjective comment: R lateral hip pn  Patient Occupation: [employer] Quality of life: good    Pain  Current pain ratin  At best pain ratin  At worst pain ratin  Quality: sharp and burning  Relieving factors: change in position, rest and medications  Aggravating factors: movement, squatting, repetitive movement and sleeping  Progression: no change    Diagnostic Tests  No diagnostic tests performed    Treatments  Previous treatment: medication  Patient Goals  Patient goals for therapy: decreased pain, increased motion, increased strength, independence with ADLs/IADLs and return to sport/leisure activities               Objective          Tenderness     Additional Tenderness Details  TTP R greater trochanter, glut max,  piriformis (mid portion)    Active Range of Motion   Left Hip   Flexion: 120 degrees   Abduction: 45 degrees   External rotation (90/90): 35 degrees   Internal rotation (90/90): 52 degrees     Right Hip   Flexion: 120 degrees   Abduction: 45 degrees   External rotation (90/90): 35 degrees   Internal rotation (90/90): 45 degrees with pain    Additional Active Range of Motion Details  Trunk Flxn 100% without pn  Trunk Extn 100% without pn  Trunk Rotation L 100%  R 100% without pn  Trunk SB L 100%   R 100% without pn      Passive Range of Motion     Additional Passive Range of Motion Details  No local or radiating pn w/ PA glides of the lumbar spine; mobility appears intact    Strength/Myotome Testing     Left Hip   Planes of Motion   Flexion: 5  Extension: 5  Abduction: 5  External rotation: 5    Right Hip   Planes of Motion   Flexion: 5  Extension: 5  Abduction: 5  External rotation: 5    Left Knee   Flexion: 5  Extension: 5    Right Knee   Flexion: 5  Extension: 5    Additional Strength Details  pn w/ initiating hip abd in sidelying but min pn w/ MMT    Tests     Lumbar     Right   Negative passive SLR and quadrant.     Right Hip   Positive RENAE, Stuart and piriformis.   Negative FADIR and scour.     Additional Tests Details  Flexibility  Hamstring: mild impairment R; no impairment L  Quad: no impairment  Hip Flexor: no impairment  Piriformis: mild impairment-R; no impairment L  IT Band: moderate impairment-R      Ambulation     Observational Gait   Gait: within functional limits     Functional Assessment     Single Leg Stance   Left: 30 seconds  Right: 30 (mild lateral hip pn) seconds      See Exercise, Manual, and Modality Logs for complete treatment.       Assessment & Plan     Assessment  Impairments: abnormal or restricted ROM, activity intolerance, impaired physical strength, lacks appropriate home exercise program and pain with function  Functional Limitations: sleeping, uncomfortable because of pain and  sitting  Assessment details: Pt is a 36 YOF who presents to PT w/ complaint of chronic lateral R hip pn that onset while training for and participating in a full marathon in Dec 2021. Findings consistent w/ greater trochanteric pn syndrome of the R hip. She exhibits painful and limited hip mobility w/ abd and rotation most affected, decreased hamstring/piriformis/TFL flexibility, impaired load tolerance. She denies groin pn or mechanical symptoms of popping/clicking that would indicate intra-articular pathology. Hip scour (-). FADIR/RENAE, piriformis test (+). Concordant symptoms reproduced w/ SLS, stretching or activation of the posterolateral hip musculature. She is TTP directly over the greater trochanter. Her pn and deficits limit her tolerance to certain sleeping and sitting positions, running, squatting. She would benefit from skilled PT services to address her deficits, decrease pn, and assist w/ return to PLOF    Barriers to therapy: n/a  Prognosis: good    Goals  Plan Goals: LTG 6 wks  1) Pt to be compliant w/ initial HEP for ROM, strength and symptom mgmt.  2) Pt to report pn w/ running to be no greater than 2/10.  3) Pt to demo restored R hip ROM/flexibility relative to contralateral LE.  4) Pt to improve LEFS score to 76/80 or better to reflect improved pn and function.  5) Pt to report no pn w/ LE mobility/strength testing.      Plan  Therapy options: will be seen for skilled therapy services  Planned modality interventions: cryotherapy, TENS, thermotherapy (hydrocollator packs), ultrasound and dry needling  Planned therapy interventions: abdominal trunk stabilization, balance/weight-bearing training, flexibility, functional ROM exercises, gait training, home exercise program, joint mobilization, manual therapy, neuromuscular re-education, soft tissue mobilization, strengthening, stretching, therapeutic activities and transfer training  Frequency: 1x week  Duration in weeks: 12  Treatment plan discussed  with: patient  Plan details: TE/TA/NMR/MT/DN to address noted deficits w/ emphasis on progressive loading/strengthening of the posterolateral hip musculature; use of modalities as indicated for pn control        History # of Personal Factors and/or Comorbidities: LOW (0)  Examination of Body System(s): # of elements: LOW (1-2)  Clinical Presentation: EVOLVING  Clinical Decision Making: LOW       Timed:         Manual Therapy:    0     mins  47384;     Therapeutic Exercise:    0     mins  16259;     Neuromuscular Edita:    0    mins  10508;    Therapeutic Activity:     10    mins  59981;     Gait Trainin     mins  91022;     Ultrasound:     0     mins  76777;    Ionto                               0    mins   76589  Self Care                       0     mins   85588  Canalith Repos    0     mins 09682      Un-Timed:  Electrical Stimulation:    0     mins  00976 ( );  Dry Needling     0     mins self-pay  Traction     0     mins 63399  Low Eval     30     Mins  92434  Mod Eval     0     Mins  91487  High Eval                       0     Mins  53478        Timed Treatment:   10   mins   Total Treatment:     40   mins          PT: Brandi Diaz PT     [place] License: #[id number]    Electronically signed by Brandi Diaz PT, 22, 10:18 AM EDT    Certification Period: 2022 thru 2022  I certify that the therapy services are furnished while this patient is under my care.  The services outlined above are required by this patient, and will be reviewed every 90 days.         Physician Signature:__________________________________________________    PHYSICIAN: Mery Casas MD      DATE:     Please sign and return via fax to [phone]. Thank you, [facility].

## 2022-04-26 NOTE — PATIENT INSTRUCTIONS
Access Code: [email or web address]  URL: [email or web address]  Date: 04/26/2022  Prepared by: Brandi Diaz    Exercises  Sidelying Hip Abduction isometric (pillow between knees for comfort) - 1-2 x daily - 10 reps - 10-15 sec hold  Clamshell - 1-2 x daily - 15-30 reps  Supine Bridge - 1-2 x daily - 15-30 reps  Mini Squat - 1-2 x daily - 15-30 reps    Provided education re: nature of condition, avoidance of aggravating positions/activities, use of pillows between legs when sidesleeping

## 2022-05-06 ENCOUNTER — TREATMENT (OUTPATIENT)
Dept: PHYSICAL THERAPY | Facility: CLINIC | Age: 37
End: 2022-05-06

## 2022-05-06 DIAGNOSIS — M25.551 RIGHT HIP PAIN: Primary | ICD-10-CM

## 2022-05-06 PROCEDURE — 97110 THERAPEUTIC EXERCISES: CPT | Performed by: PHYSICAL THERAPIST

## 2022-05-06 PROCEDURE — 97112 NEUROMUSCULAR REEDUCATION: CPT | Performed by: PHYSICAL THERAPIST

## 2022-05-06 NOTE — PROGRESS NOTES
Physical Therapy Daily Treatment Note      Patient: Jazzy CEDENO Click   : 1985  Referring practitioner: Mery Casas MD  Date of Initial Visit: Type: THERAPY  Noted: 2022  Today's Date: 2022  Patient seen for 2 sessions       Visit Diagnoses:    ICD-10-CM ICD-9-CM   1. Right hip pain  M25.551 719.45       Subjective   Jazzy Click reports: Pn mildly improved. Noticed a strong ache in her glut last night. Was able to alleviate pn w/ use of a heat pad. Initial exercises going well. Has had some challenge w/ squat mechanics.    Pre tx pn score: 0  Post tx pn score: 0      Objective   See Exercise, Manual, and Modality Logs for complete treatment.       Assessment & Plan     Assessment    Assessment details: Pt compliant w/ initial HEP, tolerating exercises well, and reports mild improvement in lateral hip pn since start of care. She requires min cueing for appropriate technique w/ squats. She c/o mild low back discomfort w/ deep squat so encouraged focus on good mechanics through partial ROM instead. Progressed squats/bridges by adding TB for hip abd in order to facilitate glut force output and restore dynamic hip control. She tolerated well, noting fatigue but no pn. Demonstrates improved excursion, dec pn w/ fig 4 positioning. Issued updated HEP as outlined in chart and provided RTB for home use. Pt would benefit from cont PT.    Plan  Plan details: Assess response to HEP updates; cont w/ gradual loading progression  (+) TB to clamshells, modified side planks or isometric abd at wall          Timed:         Manual Therapy:    0     mins  34799;     Therapeutic Exercise:    25     mins  52061;     Neuromuscular Edita:    10    mins  79324;    Therapeutic Activity:     0     mins  86836;     Gait Trainin     mins  38743;     Ultrasound:     0     mins  55056;    Ionto                               0    mins   52632  Self Care                       0     mins   40376  Canalith Repos    0      mins 47093      Un-Timed:  Electrical Stimulation:    0     mins  90229 ( );  Dry Needling     0     mins self-pay  Traction     0     mins 87168      Timed Treatment:   35   mins   Total Treatment:     35   mins    Brandi Diaz, PT  [place] License: #[id number]

## 2022-05-13 ENCOUNTER — TREATMENT (OUTPATIENT)
Dept: PHYSICAL THERAPY | Facility: CLINIC | Age: 37
End: 2022-05-13

## 2022-05-13 DIAGNOSIS — M25.551 RIGHT HIP PAIN: Primary | ICD-10-CM

## 2022-05-13 PROCEDURE — 20561 NDL INSJ W/O NJX 3+ MUSC: CPT | Performed by: PHYSICAL THERAPIST

## 2022-05-13 NOTE — PROGRESS NOTES
Physical Therapy Daily Treatment Note      Patient: Jazzy CEDENO Click   : 1985  Referring practitioner: Mery Casas MD  Date of Initial Visit: Type: THERAPY  Noted: 2022  Today's Date: 2022  Patient seen for 3 sessions       Visit Diagnoses:    ICD-10-CM ICD-9-CM   1. Right hip pain  M25.551 719.45       Subjective   Jazzy Click reports: Pn elevated over the past week after working on exercise progressions from last visit and painting inside her house. Has noticed some throbbing pn extending to her calf today.    Pre tx pn score: 0  Post tx pn score: 0      Objective   See Exercise, Manual, and Modality Logs for complete treatment.     Slump/SLR (-)  No TTP in calf  TTP R mid/lateral piriformis, length of ITB, lateral femoral condyle, greater trochanter    Assessment & Plan     Assessment    Assessment details: Pn elevated after recent HEP progressions. After discussing benefits of DN and obtaining written consent, initiated DN of the posterolateral hip and thigh. Pt tolerated well. Discussed returning to initial HEP and deferring recently issued WBing exercises, focusing instead on [place] isometric hip abd exercises (sidelying hip abd w/ isometric hold, supine hip abd isometric w/ TB resistance, isotonic clamshells w/o TB). Encouraged long isometric holds up to 30-45 seconds and low reps of 5-10. She verbalized understanding.    Plan  Plan details: Assess response to DN and HEP modification          Timed:         Manual Therapy:    0     mins  27086;     Therapeutic Exercise:    0     mins  10875;     Neuromuscular Edita:    0    mins  74219;    Therapeutic Activity:     0     mins  00686;     Gait Trainin     mins  69449;     Ultrasound:     0     mins  25697;    Ionto                               0    mins   08056  Self Care                       0     mins   45138  Canalith Repos    0     mins 47047      Un-Timed:  Electrical Stimulation:    0     mins  96088 ( );  Dry  Needling     30     mins self-pay  Traction     0     mins 20216      Timed Treatment:   0   mins   Total Treatment:     30   mins    Brandi Diaz, PT  [place] License: #[id number]

## 2022-05-23 ENCOUNTER — TREATMENT (OUTPATIENT)
Dept: PHYSICAL THERAPY | Facility: CLINIC | Age: 37
End: 2022-05-23

## 2022-05-23 DIAGNOSIS — M25.551 RIGHT HIP PAIN: Primary | ICD-10-CM

## 2022-05-23 PROCEDURE — 20561 NDL INSJ W/O NJX 3+ MUSC: CPT | Performed by: PHYSICAL THERAPIST

## 2022-05-23 NOTE — PROGRESS NOTES
Physical Therapy Daily Treatment Note      Patient: Jazzy CEDENO Click   : 1985  Referring practitioner: Mery Casas MD  Date of Initial Visit: Type: THERAPY  Noted: 2022  Today's Date: 2022  Patient seen for 4 sessions       Visit Diagnoses:    ICD-10-CM ICD-9-CM   1. Right hip pain  M25.551 719.45       Subjective   Jazzy Click reports: Pn always present to some degree, not as intense as before. Had a couple days of relief after DN-no adverse effects. Still has occasional discomfort at night. Laying on R side most uncomfortable but other positions can be as well.    Pre tx pn score: 2-3  Post tx pn score: 2-3      Objective   See Exercise, Manual, and Modality Logs for complete treatment.     Mild lateral hip pn end range hip IR  No pn w/ standing trunk mobility  No pn w/ SLS      Assessment & Plan     Assessment    Assessment details: Pn less intense relative to previous visit. Pt reports good response to DN. Continued DN and followed w/ progression of hip abd/ER isometric loading program as outlined in chart. Initiated light ITB, piriformis stretches which she was able to complete w/ min discomfort. Demo good stability in SLS w/ no reported pn. Progressed to staggered stance RDL for inc loading of the posterolateral hip w/o complaint. Pt would benefit from ongoing PT.          Timed:         Manual Therapy:    0     mins  03964;     Therapeutic Exercise:    0     mins  69434;     Neuromuscular Edita:    0    mins  82957;    Therapeutic Activity:     0     mins  21304;     Gait Trainin     mins  90086;     Ultrasound:     0     mins  34644;    Ionto                               0    mins   68345  Self Care                       0     mins   92686  Canalith Repos    0     mins 35460      Un-Timed:  Electrical Stimulation:    0     mins  02030 ( );  Dry Needling     30     mins self-pay  Traction     0     mins 69201      Timed Treatment:   0   mins   Total Treatment:     30    mins    Brandi Diaz, PT  [place] License: #[id number]

## 2022-06-03 ENCOUNTER — TREATMENT (OUTPATIENT)
Dept: PHYSICAL THERAPY | Facility: CLINIC | Age: 37
End: 2022-06-03

## 2022-06-03 DIAGNOSIS — M25.551 RIGHT HIP PAIN: Primary | ICD-10-CM

## 2022-06-03 PROCEDURE — 97112 NEUROMUSCULAR REEDUCATION: CPT | Performed by: PHYSICAL THERAPIST

## 2022-06-03 PROCEDURE — 97530 THERAPEUTIC ACTIVITIES: CPT | Performed by: PHYSICAL THERAPIST

## 2022-06-03 PROCEDURE — 97110 THERAPEUTIC EXERCISES: CPT | Performed by: PHYSICAL THERAPIST

## 2022-06-03 NOTE — PROGRESS NOTES
Physical Therapy Reassessment  Patient: Jazzy CEDENO Click   : 1985  Diagnosis/ICD-10 Code:  Right hip pain [M25.551]  Referring practitioner: Mery Casas MD  Date of Initial Visit: 6/3/2022  Today's Date: 6/3/2022  Patient seen for 5 sessions         Visit Diagnoses:    ICD-10-CM ICD-9-CM   1. Right hip pain  M25.551 719.45     Clinical Progress: improved  Home Program Compliance: Yes  Treatment has included: therapeutic exercise, neuromuscular re-education, manual therapy, therapeutic activity, electrical stimulation and dry needling      Subjective   Jazzy Click reports: Reports increased pn after DN techniques performed at previous visit. Pn improved some since start of care but has persisted. Able to hold current isometrics for a minute or greater. Pn waxes and wanes.    Pre tx pn score: 0  Post tx pn score: 0      Objective          Tenderness     Additional Tenderness Details  TTP R greater trochanter, glut max, piriformis (mid and lateral portions), TFL and prox/mid aspect ITB    Active Range of Motion   Left Hip   Flexion: 120 degrees   Abduction: 45 degrees   External rotation (90/90): 35 degrees   Internal rotation (90/90): 52 degrees     Right Hip   Flexion: 120 degrees   Abduction: 45 degrees   External rotation (90/90): 35 degrees   Internal rotation (90/90): 45 degrees with pain    Additional Active Range of Motion Details  Trunk Flxn 100% without pn  Trunk Extn 100% without pn  Trunk Rotation L 100%  R 100% without pn  Trunk SB L 100%   R 100% without pn    Mild lateral hip pn at end range passive/active hip IR    Passive Range of Motion     Additional Passive Range of Motion Details  Reports radiating pn into R gluteal region w/ PA gliding of the mid/lower lumbar segments    Strength/Myotome Testing     Left Hip   Planes of Motion   Flexion: 5  Extension: 5  Abduction: 5  External rotation: 5    Right Hip   Planes of Motion   Flexion: 5  Extension: 5  Abduction: 5  External rotation:  "5    Left Knee   Flexion: 5  Extension: 5    Right Knee   Flexion: 5  Extension: 5    Additional Strength Details  No pn w/ active or resisted hip abd, ER    Tests     Lumbar     Right   Negative passive SLR and quadrant.     Right Pelvic Girdle/Sacrum   Negative: sacral spring and thigh thrust.     Right Hip   Positive RENAE.   Negative FADIR, Stuart and scour.     Additional Tests Details  Flexibility  Hamstring: mild impairment R; no impairment L  Quad: no impairment  Hip Flexor: no impairment  Piriformis: mild impairment-R; no impairment L  IT Band: mild impairment-R      Ambulation     Observational Gait   Gait: within functional limits     Functional Assessment     Forward Step Up 6\"     Right Leg  No pain and no valgus.     Forward Step Down 6\"     Right Leg  No pain and no valgus.     Single Leg Stance   Left: 30 seconds  Right: 30 (no pn) seconds      See Exercise, Manual, and Modality Logs for complete treatment.       Assessment & Plan     Assessment    Assessment details: Reassessment performed. Pt has completed 5 PT visits. She reports only slight improvement in lateral hip pn. Strength and flexibility improved since start of care. She exhibits full trunk and hip ROM w/ exception of mild limitation in R hip IR, which elicits lateral hip pn. She has no pn w/ resisted hip abd or SLS >30 sec. Hip scour (-). She did note pn in R gluteal region w/ PA gliding of the mid/lower lumbar segments indicating possible lumbar component to her symptoms. Discussed adding prone press ups to her HEP while monitoring change in symptoms (centralization, peripheralization, etc). She verbalized understanding. Pt would benefit from continued PT to restore full strength, ROM, and function in order to allow return to usual activities w/o pn or dysfunction.      Goals  Plan Goals: LTG 6 wks  1) Pt to be compliant w/ initial HEP for ROM, strength and symptom mgmt.-MET  2) Pt to report pn w/ running to be no greater than " 2/10.-ONGOING  3) Pt to demo restored R hip ROM/flexibility relative to contralateral LE.-PROGRESSING  4) Pt to improve LEFS score to 76/80 or better to reflect improved pn and function.-ONGOING  5) Pt to report no pn w/ LE mobility/strength testing.-PARTIALLY MET      Plan  Plan details: Cont w/ focus on restoring pn free hip mobility, maximizing hip strength and dynamic stability; continue to assess potential lumbar involvement        Progress toward previous goals: Partially Met        Timed:         Manual Therapy:    0     mins  73013;     Therapeutic Exercise:    15     mins  74301;     Neuromuscular Edita:    8    mins  56702;    Therapeutic Activity:     15     mins  20356;     Gait Trainin     mins  42508;     Ultrasound:     0     mins  65577;    Ionto                               0    mins   49055  Self Care                       0     mins   61257  Canalith Repos    0     mins 95443      Un-Timed:  Electrical Stimulation:    0     mins  04162 ( );  Dry Needling     0     mins self-pay  Traction     0     mins 00750  Re-Eval                           0    mins  08227      Timed Treatment:   38   mins   Total Treatment:     38   mins          PT: Brandi Diaz PT     [place] License: #[id number]    Electronically signed by Brandi Diaz, PT, 22, 10:02 AM EDT

## 2022-06-16 ENCOUNTER — OFFICE VISIT (OUTPATIENT)
Dept: ORTHOPEDIC SURGERY | Facility: CLINIC | Age: 37
End: 2022-06-16

## 2022-06-16 VITALS
SYSTOLIC BLOOD PRESSURE: 104 MMHG | BODY MASS INDEX: 24.66 KG/M2 | HEIGHT: 65 IN | DIASTOLIC BLOOD PRESSURE: 68 MMHG | WEIGHT: 148 LBS

## 2022-06-16 DIAGNOSIS — M25.551 RIGHT HIP PAIN: ICD-10-CM

## 2022-06-16 DIAGNOSIS — M70.61 TROCHANTERIC BURSITIS OF RIGHT HIP: Primary | ICD-10-CM

## 2022-06-16 PROCEDURE — 99204 OFFICE O/P NEW MOD 45 MIN: CPT | Performed by: ORTHOPAEDIC SURGERY

## 2022-06-16 PROCEDURE — 20610 DRAIN/INJ JOINT/BURSA W/O US: CPT | Performed by: ORTHOPAEDIC SURGERY

## 2022-06-16 RX ORDER — TRIAMCINOLONE ACETONIDE 40 MG/ML
80 INJECTION, SUSPENSION INTRA-ARTICULAR; INTRAMUSCULAR
Status: COMPLETED | OUTPATIENT
Start: 2022-06-16 | End: 2022-06-16

## 2022-06-16 RX ORDER — LIDOCAINE HYDROCHLORIDE 10 MG/ML
3 INJECTION, SOLUTION EPIDURAL; INFILTRATION; INTRACAUDAL; PERINEURAL
Status: COMPLETED | OUTPATIENT
Start: 2022-06-16 | End: 2022-06-16

## 2022-06-16 RX ADMIN — LIDOCAINE HYDROCHLORIDE 3 ML: 10 INJECTION, SOLUTION EPIDURAL; INFILTRATION; INTRACAUDAL; PERINEURAL at 08:10

## 2022-06-16 RX ADMIN — TRIAMCINOLONE ACETONIDE 80 MG: 40 INJECTION, SUSPENSION INTRA-ARTICULAR; INTRAMUSCULAR at 08:10

## 2022-06-16 NOTE — PROGRESS NOTES
Procedure   Large Joint Arthrocentesis: R greater trochanteric bursa  Date/Time: 6/16/2022 8:10 AM  Consent given by: patient  Site marked: site marked  Timeout: Immediately prior to procedure a time out was called to verify the correct patient, procedure, equipment, support staff and site/side marked as required   Supporting Documentation  Indications: pain   Procedure Details  Location: hip - R greater trochanteric bursa  Preparation: Patient was prepped and draped in the usual sterile fashion  Needle size: 22 G  Approach: lateral  Medications administered: 80 mg triamcinolone acetonide 40 MG/ML; 3 mL lidocaine PF 1% 1 % (1cc Marcaine .75% ndc#[id number] lot#[id number] exp: [id number])  Patient tolerance: patient tolerated the procedure well with no immediate complications

## 2022-06-16 NOTE — PROGRESS NOTES
Orthopaedic Clinic Note: Hip New Patient    Chief Complaint   Patient presents with   • Right Hip - Pain        HPI  Consult from: Mery Casas MD    Jazzy Negrete is a 36 y.o. female who presents with right hip pain for 7 month(s). Onset atraumatic and gradual in nature.  Patient was training for marathon that she ran in December.  During the training sessions, her pain initiated and gradually got progressively worse culminating with the most pain upon completion of the marathon.  Since then she has had persistent nagging pain laterally.  Pain is localized to lateral trochanter and is a 4/10 on the pain scale.Pain is described as aching. Associated symptoms include pain and stiffness.  The pain is worse with walking, lying on affected side and exercise; resting and pain medication and/or NSAID improve the pain. Previous treatments have included: NSAIDS and physical therapy for 3 months duration or longer. Although some transient relief was reported with these interventions, these conservative measures have failed and symptoms have persisted. The patient is limited in daily activities and has had a significant decrease in quality of life as a result. She denies fevers, chills, or constitutional symptoms.    I have reviewed the following portions of the patient's history:History of Present Illness    Past Medical History:   Diagnosis Date   • Abnormal ECG 2018   • Abnormal Pap smear of cervix 2008   • Anxiety    • Cervical dysplasia    • Depression    • Gestational hypertension August 2015   • History of IUFD 04/09/2014    Genetic testing consistent with trisomy 21   • HPV (human papilloma virus) infection 2008   • Recurrent pregnancy loss, antepartum condition or complication    • SVT (supraventricular tachycardia) (HCC)    • Threatened miscarriage 01/03/2014   • Varicella       Past Surgical History:   Procedure Laterality Date   • CARDIAC ELECTROPHYSIOLOGY PROCEDURE     • DILATATION AND CURETTAGE  08/04/2014     MAB @ 10 weeks   • LASIK     • WISDOM TOOTH EXTRACTION        Family History   Problem Relation Age of Onset   • Lupus Mother    • Hypertension Mother    • Hyperlipidemia Mother    • Arthritis Mother    • Diabetes Father    • Hypertension Father    • Hyperlipidemia Father    • Heart disease Father    • Celiac disease Brother    • Ulcerative colitis Brother    • Hyperlipidemia Paternal Grandfather    • Hypertension Paternal Grandfather    • Colon cancer Maternal Great-Grandmother      Social History     Socioeconomic History   • Marital status:    Tobacco Use   • Smoking status: Never Smoker   • Smokeless tobacco: Never Used   Substance and Sexual Activity   • Alcohol use: Defer     Comment: Socially   • Drug use: Never   • Sexual activity: Yes     Partners: Male     Birth control/protection: I.U.D.      Current Outpatient Medications on File Prior to Visit   Medication Sig Dispense Refill   • benzoyl peroxide 5 % gel Apply 1 application topically to the appropriate area as directed 2 (Two) Times a Day. 42.5 g 2   • clindamycin (Clindagel) 1 % gel Apply 1 application topically to the appropriate area as directed 2 (Two) Times a Day. 60 g 3   • sertraline (ZOLOFT) 50 MG tablet Take 1 tablet by mouth Daily. 90 tablet 3     No current facility-administered medications on file prior to visit.      No Known Allergies     Review of Systems   Constitutional: Negative.    HENT: Negative.    Eyes: Negative.    Respiratory: Negative.    Cardiovascular: Negative.    Gastrointestinal: Negative.    Endocrine: Negative.    Genitourinary: Negative.    Musculoskeletal: Positive for arthralgias.   Skin: Negative.    Allergic/Immunologic: Negative.    Neurological: Negative.    Hematological: Negative.    Psychiatric/Behavioral: Negative.         The patient's Review of Systems was personally reviewed and confirmed as accurate.    The following portions of the patient's history were reviewed and updated as appropriate:  "allergies, current medications, past family history, past medical history, past social history, past surgical history and problem list.    Physical Exam  Blood pressure 104/68, height 165.1 cm (65\"), weight 67.1 kg (148 lb), not currently breastfeeding.    Body mass index is 24.63 kg/m².    GENERAL APPEARANCE: awake, alert & oriented x 3, in no acute distress and well developed, well nourished  PSYCH: normal affect  LUNGS:  breathing nonlabored  EYES: sclera anicteric  CARDIOVASCULAR: palpable dorsalis pedis, palpable posterior tibial bilaterally. Capillary refill less than 2 seconds  EXTREMITIES: no clubbing, cyanosis  GAIT:  Normal           Right Hip Exam:  RANGE OF MOTION:   FLEXION CONTRACTURE: None   FLEXION: 110 degrees   INTERNAL ROTATION: 20 degrees at 90 degrees of flexion   EXTERNAL ROTATION: 40 degrees at 90 degrees of flexion    PAIN WITH HIP MOTION: no  PAIN WITH LOGROLL: no  STINCHFIELD TEST: negative    KNEE EXAM: full knee ROM (0-120 degrees), stable to varus and valgus stress at terminal extension and 30 degrees flexion     STRENGTH:  5/5 hip adduction, abduction, flexion. 5/5 strength knee flexion, extension. 5/5 strength ankle dorsiflexion and plantarflexion.     GREATER TROCHANTER BURSAL PAIN:  yes     REFLEXES:   PATELLAR 2+/4   ACHILLES 2+/4    CLONUS: negative  STRAIGHT LEG TEST:   negative    SENSATION TO LIGHT TOUCH:  DEEP PERONEAL/SUPERFICIAL PERONEAL/SURAL/SAPHENOUS/TIBIAL:  intact    EDEMA:   no  ERYTHEMA:  no  WOUNDS/INCISIONS: no overlying skin problems.      Left Hip Exam:   RANGE OF MOTION:   FLEXION CONTRACTURE: None   FLEXION: 110 degrees   INTERNAL ROTATION: 20 degrees at 90 degrees of flexion   EXTERNAL ROTATION: 40 degrees at 90 degrees of flexion    PAIN WITH HIP MOTION: no  PAIN WITH LOGROLL: no  STINCHFIELD TEST: negative    KNEE EXAM: full knee ROM (0-120 degrees), stable to varus and valgus stress at terminal extension and 30 degrees flexion     STRENGTH:  5/5 hip " adduction, abduction, flexion. 5/5 strength knee flexion, extension. 5/5 strength ankle dorsiflexion and plantarflexion.     GREATER TROCHANTER BURSAL PAIN:  no     REFLEXES:   PATELLAR 2+/4   ACHILLES 2+/4    CLONUS: negative  STRAIGHT LEG TEST:   negative    SENSATION TO LIGHT TOUCH:  DEEP PERONEAL/SUPERFICIAL PERONEAL/SURAL/SAPHENOUS/TIBIAL:  intact    EDEMA:   no  ERYTHEMA:  no  WOUNDS/INCISIONS: no overlying skin problems.      ------------------------------------------------------------------    LEG LENGTHS:  equal  _____________________________________________________  _____________________________________________________    RADIOGRAPHIC FINDINGS:   Indication: Right hip pain    Comparison: No prior xrays are available for comparison    AP pelvis, hip 2 views: Right: minimal arthritic findings with preservation of the joint space; Left: minimal arthritic findings with preservation of the joint space    Assessment/Plan:   Diagnosis Plan   1. Trochanteric bursitis of right hip     2. Right hip pain  XR Hip With or Without Pelvis 2 - 3 View Right     Patient's hip pain is due to trochanteric bursitis.  She has minimal hip arthritis.  I discussed treatment options with her regarding her ongoing hip pain.  Is been refractory to anti-inflammatory treatment.  She is agreeable to trochanteric bursa cortisone injection today.  She will follow-up as needed.    Procedure Note:  I discussed with the patient the potential benefits of performing a therapeutic injection of the right hip trochanteric bursa as well as potential risks including but not limited to infection, swelling, pain, bleeding, bruising, nerve/vessel damage, skin color changes, transient elevation in blood glucose levels, and fat atrophy. After informed consent and verifying correct patient, procedure site, and type of procedure, the area was prepped with alcohol, ethyl chloride was used to numb the skin. Via the direct lateral approach, 3cc of 1%  lidocaine, 1 cc of 0.75% Marcaine and 2 cc of 40mg/ml of Kenalog were injected into the right hip trochanteric bursa. The patient tolerated the procedure well. There were no complications. A sterile dressing was placed over the injection site.      Fawad Barton MD  06/16/22  08:14 EDT

## 2022-07-04 PROCEDURE — U0004 COV-19 TEST NON-CDC HGH THRU: HCPCS | Performed by: EMERGENCY MEDICINE

## 2022-07-26 ENCOUNTER — OFFICE VISIT (OUTPATIENT)
Dept: OBSTETRICS AND GYNECOLOGY | Facility: CLINIC | Age: 37
End: 2022-07-26

## 2022-07-26 VITALS
HEIGHT: 65 IN | DIASTOLIC BLOOD PRESSURE: 74 MMHG | SYSTOLIC BLOOD PRESSURE: 110 MMHG | BODY MASS INDEX: 25.86 KG/M2 | WEIGHT: 155.2 LBS

## 2022-07-26 DIAGNOSIS — R23.4 SKIN TEXTURE CHANGES: Primary | ICD-10-CM

## 2022-07-26 PROCEDURE — 99212 OFFICE O/P EST SF 10 MIN: CPT | Performed by: NURSE PRACTITIONER

## 2022-07-26 NOTE — PROGRESS NOTES
"            CC:  Rough skin on vulva    Subjective   HPI  Jazzy Negrete is a 36 y.o. female, .  She is not have periods with her Mirena.    She noticed a \"rough\" area on her labia she would like examined.   She denies a hx of the same.  She denies itching, odor, fever, burning, abn dc, dysuria.        Additional OB/GYN History     Last Pap : neg, HPV neg 10/2021    Last mammogram: - Normal -in [place]     Tobacco Usage?: No   OB History        5    Para   3    Term   2       1    AB   2    Living   2       SAB        IAB        Ectopic        Molar        Multiple        Live Births                      Current Outpatient Medications:   •  benzoyl peroxide 5 % gel, Apply 1 application topically to the appropriate area as directed 2 (Two) Times a Day., Disp: 42.5 g, Rfl: 2  •  clindamycin (Clindagel) 1 % gel, Apply 1 application topically to the appropriate area as directed 2 (Two) Times a Day., Disp: 60 g, Rfl: 3  •  sertraline (ZOLOFT) 50 MG tablet, Take 1 tablet by mouth Daily., Disp: 90 tablet, Rfl: 3     Past Medical History:   Diagnosis Date   • Abnormal ECG    • Abnormal Pap smear of cervix    • Anxiety    • Cervical dysplasia    • Depression    • Gestational hypertension 2015   • History of IUFD 2014    Genetic testing consistent with trisomy 21   • HPV (human papilloma virus) infection    • Recurrent pregnancy loss, antepartum condition or complication    • SVT (supraventricular tachycardia) (HCC)    • Threatened miscarriage 2014   • Varicella         Past Surgical History:   Procedure Laterality Date   • CARDIAC ELECTROPHYSIOLOGY PROCEDURE     • DILATATION AND CURETTAGE  2014    MAB @ 10 weeks   • LASIK     • WISDOM TOOTH EXTRACTION         The additional following portions of the patient's history were reviewed and updated as appropriate: allergies and current medications.    Review of Systems    I have reviewed and agree with the HPIXAVI, " "and historical information as entered above. Chely Tarango Nedra, APRN    Objective   /74 (BP Location: Right arm, Patient Position: Sitting, Cuff Size: Adult)   Ht 165.1 cm (65\")   Wt 70.4 kg (155 lb 3.2 oz)   BMI 25.83 kg/m²     Physical Exam  Vitals and nursing note reviewed. Exam conducted with a chaperone present.   Constitutional:       General: She is not in acute distress.     Appearance: Normal appearance. She is normal weight. She is not ill-appearing.   Pulmonary:      Effort: Pulmonary effort is normal.   Abdominal:      General: There is no distension.      Palpations: Abdomen is soft. There is no mass.      Tenderness: There is no abdominal tenderness. There is no guarding or rebound.      Hernia: No hernia is present.   Genitourinary:     General: Normal vulva.      Labia:         Right: No rash, tenderness, lesion or injury.         Left: No rash, tenderness, lesion or injury.       Urethra: No prolapse, urethral pain, urethral swelling or urethral lesion.   Skin:     General: Skin is warm and dry.   Neurological:      Mental Status: She is alert and oriented to person, place, and time.   Psychiatric:         Mood and Affect: Mood normal.         Behavior: Behavior normal.         Assessment & Plan     Assessment     Problem List Items Addressed This Visit    None     Visit Diagnoses     Skin texture changes    -  Primary          Plan     Return if symptoms worsen or fail to improve, for Next scheduled follow up.  2.   D/w pt exam wnl.  No concerns of lesions, skin changes.      Chely Tarango Nedra, APRN  07/26/2022  "

## 2022-08-30 ENCOUNTER — TREATMENT (OUTPATIENT)
Dept: PHYSICAL THERAPY | Facility: CLINIC | Age: 37
End: 2022-08-30

## 2022-08-30 DIAGNOSIS — G44.229 CHRONIC TENSION-TYPE HEADACHE, NOT INTRACTABLE: Primary | ICD-10-CM

## 2022-08-30 PROCEDURE — 97110 THERAPEUTIC EXERCISES: CPT | Performed by: PHYSICAL THERAPIST

## 2022-08-30 PROCEDURE — 97161 PT EVAL LOW COMPLEX 20 MIN: CPT | Performed by: PHYSICAL THERAPIST

## 2022-08-30 NOTE — PATIENT INSTRUCTIONS
Discussed HEP including 3 way doorway/corner stretches, seated scapular retraction, thoracic extn mobilization in chair, wall posture w/ combined cervical/scapular retraction. To be performed 2-3x/day.

## 2022-08-30 NOTE — PROGRESS NOTES
Physical Therapy Initial Evaluation and Plan of Care    Patient: Jazzy Negrete   : 1985  Diagnosis/ICD-10 Code:  Chronic tension-type headache, not intractable [G44.229]  Referring practitioner: Mery Casas MD  Date of Initial Visit: 2022  Today's Date: 2022  Patient seen for 1 session         Visit Diagnoses:    ICD-10-CM ICD-9-CM   1. Chronic tension-type headache, not intractable  G44.229 339.12         Subjective Questionnaire: NDI:10/50=20% impairment      Subjective Evaluation    History of Present Illness  Mechanism of injury: Complains of frequent headaches over the past month or so. Occur daily and are moderate in intensity. Typically does not wake w/ HA unless she has a dream that causes her to tense, clench her jaws. Usually develop throughout the day and most often triggered by looking down to read. HA location is bilateral, radiates up both sides of her neck and into the frontal/temporal region. Not associated w/ nausea, vomiting, photo/phonosensitivity, visual changes. Not worsened w/ head/neck movement. Doesn't seem to worsen w/ routine physical activity, but running can worsen intensity if HA already present. Able to alleviate w/ Tylenol/Ibuprofen. Has had similar HA in the past, managed by limiting reading, working on postural exercises. Doesn't worsen w/ head movement, routine phsyical activity though running can make it worse if HA already present.     Subjective comment: frequent headaches  Patient Occupation: [employer] Quality of life: good    Pain  Current pain rating: 3  At best pain ratin  At worst pain ratin  Quality: tight  Relieving factors: medications and change in position  Exacerbated by: reading.  Progression: no change    Diagnostic Tests  No diagnostic tests performed    Treatments  Previous treatment: medication  Current treatment: medication  Patient Goals  Patient goals for therapy: decreased edema               Objective          Postural  Observations  Seated posture: good  Standing posture: good    Additional Postural Observation Details  R shoulder slightly protracted relative to L (RHD)    Tenderness     Additional Tenderness Details  Slight TTP occiput, R upper cervical facets R, interscapular mm    Active Range of Motion     Additional Active Range of Motion Details  Cervical and shoulder mobility WNL and pn free    Passive Range of Motion     Additional Passive Range of Motion Details  Minimal localized discomfort w/ PA gliding upper cervical segments-no provocation of concordant symptoms, no apparent mobility deficit  No pn or mobility deficit w/ PA gliding mid/lower cervical segments    Tests   Cervical   Deep neck flexor endurance: 30 seconds    Additional Tests Details  Cervical flxn/rotation test (-)    Cervical Flexibility Comments:   Upper trap: no impairment  Levator Scapula: no impairment  Pec Major: no impairment  Pec Minor: mild impairment R>L        See Exercise, Manual, and Modality Logs for complete treatment.       Assessment & Plan     Assessment  Impairments: activity intolerance, lacks appropriate home exercise program and pain with function  Functional Limitations: uncomfortable because of pain  Assessment details: Pt is a 36 YOF who presents to PT w/ complaint of frequent, bilateral headaches. Subjective hx and clinical exam findings suggestive of non intractable tension type headache, though she exhibits some features of cervicogenic HA as well. She reports bilateral frontal/temporal HA that originate along B UT, triggered primarily by sustained cervical flxn while reading. She exhibits full and pn free cervical ROM, intact upper cervical segmental mobility w/o provocation of concordant HA pn. Cervical flxn/rotation test (-). (-) red flag symptoms. Symptoms alleviated in past w/ activity modification, postural correction. Current symptoms managed somewhat w/ medication. Pt would benefit from skilled PT services to address  her deficits, decrease pn.    Barriers to therapy: n/a  Prognosis: good    Goals  Plan Goals: LTG 6 wks  1) Pt to be independent w/ long term HEP and self mgmt.  2) Pt to report decreased frequency/intensity of HAs.  3) Pt to improve NDI score to 5/50 or better to reflect improved pn and function.      Plan  Therapy options: will be seen for skilled therapy services  Planned modality interventions: cryotherapy, dry needling, TENS, thermotherapy (hydrocollator packs) and ultrasound  Planned therapy interventions: body mechanics training, flexibility, functional ROM exercises, home exercise program, manual therapy, neuromuscular re-education, postural training, strengthening, stretching and therapeutic activities  Frequency: 1x week  Duration in weeks: 8  Plan details: TE/TA/NMR/MT w/ emphasis on postural re-education, interscapular strengthening, ant shoulder stretching; DN, modalities as indicated for pn control        History # of Personal Factors and/or Comorbidities: LOW (0)  Examination of Body System(s): # of elements: LOW (1-2)  Clinical Presentation: EVOLVING  Clinical Decision Making: LOW       Timed:         Manual Therapy:    0     mins  01946;     Therapeutic Exercise:    8     mins  12233;     Neuromuscular Edita:    0    mins  92153;    Therapeutic Activity:     0     mins  63374;     Gait Trainin     mins  38879;     Ultrasound:     0     mins  27821;    Ionto                               0    mins   92547  Self Care                       0     mins   37399  Canalith Repos    0     mins 85991      Un-Timed:  Electrical Stimulation:    0     mins  17540 ( );  Dry Needling     0     mins self-pay  Traction     0     mins 74841  Low Eval     20     Mins  39167  Mod Eval     0     Mins  28863  High Eval                       0     Mins  26231        Timed Treatment:   8   mins   Total Treatment:     28   mins          PT: Brandi Diaz, PT     [place] License: #[id number]    Electronically signed  by Brandi Diaz, PT, 08/30/22, 9:10 AM EDT    Certification Period: 8/30/2022 thru 11/27/2022  I certify that the therapy services are furnished while this patient is under my care.  The services outlined above are required by this patient, and will be reviewed every 90 days.         Physician Signature:__________________________________________________    PHYSICIAN: Mery Casas MD      DATE:     Please sign and return via fax to [phone]. Thank you, Psychiatric Physical Therapy.

## 2022-10-21 ENCOUNTER — OFFICE VISIT (OUTPATIENT)
Dept: OBSTETRICS AND GYNECOLOGY | Facility: CLINIC | Age: 37
End: 2022-10-21

## 2022-10-21 VITALS
DIASTOLIC BLOOD PRESSURE: 68 MMHG | HEIGHT: 65 IN | SYSTOLIC BLOOD PRESSURE: 112 MMHG | WEIGHT: 155.8 LBS | BODY MASS INDEX: 25.96 KG/M2

## 2022-10-21 DIAGNOSIS — Z30.431 IUD CHECK UP: ICD-10-CM

## 2022-10-21 DIAGNOSIS — Z01.419 WOMEN'S ANNUAL ROUTINE GYNECOLOGICAL EXAMINATION: Primary | ICD-10-CM

## 2022-10-21 PROCEDURE — 99395 PREV VISIT EST AGE 18-39: CPT | Performed by: NURSE PRACTITIONER

## 2022-10-21 NOTE — PROGRESS NOTES
Gynecologic Annual Exam Note        Gynecologic Exam        Subjective     HPI  Jazzy Negrete is a 37 y.o.  female who presents for annual well woman exam as a established patient. There were no changes to her medical or surgical history since her last visit.. Patient reports problems with: none.  Her periods are absent secondary to birth control. Pt states she does have occasional spotting. Partner Status: Marital Status: .  She is sexually active. She has not had new partners.. STD testing recommendations have been explained to the patient and she does not desire STD testing.    Additional OB/GYN History   Current contraception: contraceptive methods: IUD.  Insertion date: 2018  Desires to: continue contraception  Thromboembolic Disease: none    History of STD: no    Last Pap : 10/5/21. Results: negative. HPV: negative  Last Completed Pap Smear          PAP SMEAR (Every 3 Years) Next due on 10/5/2024    10/05/2021  SCANNED - PAP SMEAR    2019  Patient-Reported (Performed Externally)                 History of abnormal Pap smear: yes - , since then been normal  Gardasil status:completed  Family history of uterine, colon, breast, or ovarian cancer: yes - maternal great grandmother-colon cancer  Performs monthly Self-Breast Exam: yes  Exercises Regularly:yes  Feelings of Anxiety or Depression: no, managed with medication  Tobacco Usage?: No       Current Outpatient Medications:   •  benzoyl peroxide 5 % gel, Apply 1 application topically to the appropriate area as directed 2 (Two) Times a Day., Disp: 42.5 g, Rfl: 2  •  clindamycin (Clindagel) 1 % gel, Apply 1 application topically to the appropriate area as directed 2 (Two) Times a Day., Disp: 60 g, Rfl: 3  •  sertraline (ZOLOFT) 50 MG tablet, Take 1 tablet by mouth Daily., Disp: 90 tablet, Rfl: 3     Patient denies the need for medication refills today.    OB History        5    Para   3    Term   2       1    AB    2    Living   2       SAB        IAB        Ectopic        Molar        Multiple        Live Births                    Health Maintenance   Topic Date Due   • COVID-19 Vaccine (4 - Booster for Moderna series) 02/04/2022   • INFLUENZA VACCINE  08/01/2022   • Annual Gynecologic Pelvic and Breast Exam  10/06/2022   • ANNUAL PHYSICAL  02/12/2023   • PAP SMEAR  10/05/2024   • TDAP/TD VACCINES (2 - Td or Tdap) 03/05/2028   • HEPATITIS C SCREENING  Completed   • Pneumococcal Vaccine 0-64  Aged Out       Past Medical History:   Diagnosis Date   • Abnormal ECG 2018   • Abnormal Pap smear of cervix 2008   • Anxiety    • Cervical dysplasia    • Depression    • Gestational hypertension August 2015   • History of IUFD 04/09/2014    Genetic testing consistent with trisomy 21   • HPV (human papilloma virus) infection 2008   • Recurrent pregnancy loss, antepartum condition or complication    • SVT (supraventricular tachycardia) (HCC)    • Threatened miscarriage 01/03/2014   • Varicella         Past Surgical History:   Procedure Laterality Date   • CARDIAC ELECTROPHYSIOLOGY PROCEDURE     • DILATATION AND CURETTAGE  08/04/2014    MAB @ 10 weeks   • LASIK     • WISDOM TOOTH EXTRACTION         The additional following portions of the patient's history were reviewed and updated as appropriate: allergies, current medications, past family history, past medical history, past social history, past surgical history and problem list.    Review of Systems   Constitutional: Negative.    HENT: Negative.    Eyes: Negative.    Respiratory: Negative.    Cardiovascular: Negative.    Gastrointestinal: Negative.    Endocrine: Negative.    Genitourinary: Negative.    Musculoskeletal: Negative.    Skin: Negative.    Allergic/Immunologic: Negative.    Neurological: Negative.    Hematological: Negative.    Psychiatric/Behavioral: Negative.          I have reviewed and agree with the HPI, ROS, and historical information as entered above. Chely Sneed,  "APRN        Objective   /68   Ht 165.1 cm (65\")   Wt 70.7 kg (155 lb 12.8 oz)   LMP  (LMP Unknown)   BMI 25.93 kg/m²     Physical Exam  Vitals and nursing note reviewed. Exam conducted with a chaperone present.   Constitutional:       General: She is not in acute distress.     Appearance: Normal appearance. She is well-developed and normal weight. She is not ill-appearing.   HENT:      Head: Normocephalic and atraumatic.   Neck:      Thyroid: No thyroid mass or thyromegaly.   Pulmonary:      Effort: Pulmonary effort is normal. No retractions.   Chest:      Chest wall: No mass.   Breasts:     Right: Normal. No mass, nipple discharge, skin change or tenderness.      Left: Normal. No mass, nipple discharge, skin change or tenderness.   Abdominal:      Palpations: Abdomen is soft. Abdomen is not rigid. There is no mass.      Tenderness: There is no abdominal tenderness. There is no guarding.      Hernia: No hernia is present. There is no hernia in the left inguinal area.   Genitourinary:     General: Normal vulva.      Labia:         Right: No rash, tenderness or lesion.         Left: No rash, tenderness or lesion.       Vagina: Normal. No vaginal discharge or lesions.      Cervix: Normal.      Uterus: Normal. Not enlarged, not fixed and not tender.       Adnexa: Right adnexa normal and left adnexa normal.        Right: No mass or tenderness.          Left: No mass or tenderness.        Rectum: No external hemorrhoid.      Comments: IUD strings noted approx 3 cm  Musculoskeletal:      Cervical back: Normal range of motion. No muscular tenderness.   Skin:     General: Skin is warm and dry.   Neurological:      Mental Status: She is alert and oriented to person, place, and time.   Psychiatric:         Mood and Affect: Mood normal.         Behavior: Behavior normal.            Assessment and Plan    Problem List Items Addressed This Visit    None  Visit Diagnoses     Women's annual routine gynecological " examination    -  Primary    IUD check up              1. GYN annual well woman exam.   2. Reviewed pap guidelines.   She agrees to follow guidelines.  3. Reviewed monthly self breast exams.  Instructed to call with lumps, pain, or breast discharge.    4. Reviewed exercise as a preventative health measures.   5. Reccommended Flu Vaccine in Fall of each year.  6. RTC in 1 year or PRN with problems  Return in about 1 year (around 10/21/2023) for Annual physical.      Chely Sneed, APRN  10/21/2022

## 2023-02-17 ENCOUNTER — OFFICE VISIT (OUTPATIENT)
Dept: INTERNAL MEDICINE | Facility: CLINIC | Age: 38
End: 2023-02-17
Payer: COMMERCIAL

## 2023-02-17 VITALS
RESPIRATION RATE: 16 BRPM | BODY MASS INDEX: 25.33 KG/M2 | WEIGHT: 152 LBS | DIASTOLIC BLOOD PRESSURE: 62 MMHG | HEIGHT: 65 IN | HEART RATE: 69 BPM | OXYGEN SATURATION: 98 % | SYSTOLIC BLOOD PRESSURE: 98 MMHG | TEMPERATURE: 98.5 F

## 2023-02-17 DIAGNOSIS — K21.9 GASTROESOPHAGEAL REFLUX DISEASE, UNSPECIFIED WHETHER ESOPHAGITIS PRESENT: ICD-10-CM

## 2023-02-17 DIAGNOSIS — Z23 NEED FOR VACCINATION: ICD-10-CM

## 2023-02-17 DIAGNOSIS — Z00.00 ANNUAL PHYSICAL EXAM: Primary | ICD-10-CM

## 2023-02-17 DIAGNOSIS — F41.9 ANXIETY: ICD-10-CM

## 2023-02-17 PROCEDURE — 0124A PR ADM SARSCOV2 30MCG/0.3ML BST: CPT | Performed by: NURSE PRACTITIONER

## 2023-02-17 PROCEDURE — 91312 COVID-19 (PFIZER) BIVALENT BOOSTER 12+YRS: CPT | Performed by: NURSE PRACTITIONER

## 2023-02-17 PROCEDURE — 99395 PREV VISIT EST AGE 18-39: CPT | Performed by: NURSE PRACTITIONER

## 2023-02-17 RX ORDER — FAMOTIDINE 40 MG/1
40 TABLET, FILM COATED ORAL DAILY
COMMUNITY

## 2023-02-17 NOTE — PROGRESS NOTES
New Patient Office Visit      Date: 2023   Patient Name: Jazzy Negrete  : 1985   MRN: [id number]     Chief Complaint:    Chief Complaint   Patient presents with   • Annual Exam       History of Present Illness: Jazzy Negrete is a 37 y.o. female who is here today to establish care.  She is also here for her annual health maintenance exam and physical.  She has no acute complaints or concerns today.  She follows with GEE Mcdaniel [facility], Dr. Patel (GI), and sees dermatology annually for skin checks.     Does not have any specific concerns today.   Father is diabetic.   Both her parents have high cholesterol.  Takes Zoloft at 50 mg.  Does not want to increase the Zoloft as it affects her libido.  Seen by dermatology annually for a skin check.  Last Pap smear was in 10/2022, which was normal.  She is UTD with dental examination and cleanings  She is overdue for eye exam, and notes having LASIK in .  She rarely consumes alcohol. Denies smoking or  illicit drugs.   Up to date on all her vaccinations.  She received her 4th COVID-19 vaccination today.       Subjective      Review of Systems:   Review of Systems   Constitutional: Negative for appetite change, fatigue, unexpected weight gain and unexpected weight loss.   HENT: Negative for congestion, postnasal drip, sneezing and sore throat.    Eyes: Negative.    Respiratory: Negative.    Cardiovascular: Negative.    Gastrointestinal: Negative.    Endocrine: Negative for cold intolerance and heat intolerance.   Genitourinary: Negative for dysuria, frequency and urgency.   Musculoskeletal: Negative for arthralgias, gait problem and myalgias.   Skin: Negative for pallor and rash.   Allergic/Immunologic: Negative.    Neurological: Negative for dizziness, weakness, light-headedness, numbness and headache.   Hematological: Negative.    Psychiatric/Behavioral: Negative for sleep disturbance and depressed mood. The patient is not  nervous/anxious.        Past Medical History:   Past Medical History:   Diagnosis Date   • Abnormal ECG 2018   • Abnormal Pap smear of cervix 2008   • Anxiety    • Cervical dysplasia    • Depression    • Gestational hypertension August 2015   • History of IUFD 04/09/2014    Genetic testing consistent with trisomy 21   • HPV (human papilloma virus) infection 2008   • Recurrent pregnancy loss, antepartum condition or complication    • SVT (supraventricular tachycardia) (HCC)    • Threatened miscarriage 01/03/2014   • Varicella        Past Surgical History:   Past Surgical History:   Procedure Laterality Date   • CARDIAC ELECTROPHYSIOLOGY PROCEDURE     • DILATATION AND CURETTAGE  08/04/2014    MAB @ 10 weeks   • LASIK     • WISDOM TOOTH EXTRACTION         Family History:   Family History   Problem Relation Age of Onset   • Diabetes Father    • Hypertension Father    • Hyperlipidemia Father    • Heart disease Father    • Lupus Mother    • Hypertension Mother    • Hyperlipidemia Mother    • Arthritis Mother    • Celiac disease Brother    • Ulcerative colitis Brother    • Hyperlipidemia Paternal Grandfather    • Hypertension Paternal Grandfather    • Colon cancer Maternal Great-Grandmother    • Breast cancer Neg Hx    • Ovarian cancer Neg Hx    • Uterine cancer Neg Hx        Social History:   Social History     Socioeconomic History   • Marital status:    Tobacco Use   • Smoking status: Never   • Smokeless tobacco: Never   Vaping Use   • Vaping Use: Never used   Substance and Sexual Activity   • Alcohol use: Defer     Comment: Socially   • Drug use: Never   • Sexual activity: Yes     Partners: Male     Birth control/protection: I.U.D.     Comment: Mirena       Medications:     Current Outpatient Medications:   •  famotidine (PEPCID) 40 MG tablet, Take 40 mg by mouth Daily. OTC, Disp: , Rfl:   •  sertraline (ZOLOFT) 50 MG tablet, Take 1 tablet by mouth Daily., Disp: 90 tablet, Rfl: 3  •  tretinoin (RETIN-A) 0.05 %  "cream, Apply pea size amount topically to the face  as directed every night as needed., Disp: 45 g, Rfl: 11    Allergies:   No Known Allergies    Objective     Physical Exam:  Vital Signs:   Vitals:    02/17/23 0823   BP: 98/62   Pulse: 69   Resp: 16   Temp: 98.5 °F (36.9 °C)   SpO2: 98%   Weight: 68.9 kg (152 lb)   Height: 165.1 cm (65\")   PainSc: 0-No pain     Body mass index is 25.29 kg/m².  BMI is >= 25 and <30. (Overweight) The following options were offered after discussion;: exercise counseling/recommendations and nutrition counseling/recommendations       Physical Exam  Vitals and nursing note reviewed.   Constitutional:       General: She is not in acute distress.     Appearance: Normal appearance. She is normal weight.   HENT:      Head: Normocephalic and atraumatic.      Right Ear: Tympanic membrane, ear canal and external ear normal.      Left Ear: Tympanic membrane, ear canal and external ear normal.      Nose: Nose normal.      Mouth/Throat:      Mouth: Mucous membranes are moist.      Pharynx: Oropharynx is clear.   Eyes:      Extraocular Movements: Extraocular movements intact.      Conjunctiva/sclera: Conjunctivae normal.      Pupils: Pupils are equal, round, and reactive to light.   Neck:      Thyroid: No thyroid mass, thyromegaly or thyroid tenderness.      Vascular: No carotid bruit or JVD.   Cardiovascular:      Rate and Rhythm: Normal rate and regular rhythm.      Heart sounds: Normal heart sounds.   Pulmonary:      Effort: Pulmonary effort is normal. No respiratory distress.      Breath sounds: Normal breath sounds.   Abdominal:      General: Abdomen is flat. Bowel sounds are normal.      Palpations: Abdomen is soft.      Tenderness: There is no abdominal tenderness.   Musculoskeletal:         General: Normal range of motion.      Cervical back: Neck supple.      Right lower leg: No edema.      Left lower leg: No edema.   Lymphadenopathy:      Cervical: No cervical adenopathy.   Skin:     " General: Skin is warm and dry.      Capillary Refill: Capillary refill takes less than 2 seconds.   Neurological:      General: No focal deficit present.      Mental Status: She is alert and oriented to person, place, and time. Mental status is at baseline.      Sensory: No sensory deficit.      Motor: No weakness.      Coordination: Coordination normal.      Gait: Gait normal.   Psychiatric:         Mood and Affect: Mood normal.         Behavior: Behavior normal.         Thought Content: Thought content normal.         Judgment: Judgment normal.             Results:     Labs:    Latest Reference Range & Units 02/11/22 09:56   Glucose 65 - 99 mg/dL 83   Sodium 136 - 145 mmol/L 139   Potassium 3.5 - 5.2 mmol/L 4.1   CO2 22.0 - 29.0 mmol/L 28.8   Chloride 98 - 107 mmol/L 103   Anion Gap 5.0 - 15.0 mmol/L 7.2   Creatinine 0.57 - 1.00 mg/dL 0.87   BUN 6 - 20 mg/dL 14   BUN/Creatinine Ratio 7.0 - 25.0  16.1   Calcium 8.6 - 10.5 mg/dL 9.1   eGFR Non African Am >60 mL/min/1.73 74   Alkaline Phosphatase 39 - 117 U/L 49   Total Protein 6.0 - 8.5 g/dL 7.1   ALT (SGPT) 1 - 33 U/L 17   AST (SGOT) 1 - 32 U/L 20   Total Bilirubin 0.0 - 1.2 mg/dL 0.4   Albumin 3.50 - 5.20 g/dL 4.70   Globulin gm/dL 2.4   A/G Ratio g/dL 2.0   TSH Baseline 0.270 - 4.200 uIU/mL 1.500   Total Cholesterol 0 - 200 mg/dL 162   HDL Cholesterol 40 - 60 mg/dL 49   LDL Cholesterol  0 - 100 mg/dL 104 (H)   VLDL Cholesterol 5 - 40 mg/dL 9   Triglycerides 0 - 150 mg/dL 42   LDL/HDL Ratio  2.13   WBC 3.40 - 10.80 10*3/mm3 5.61   RBC 3.77 - 5.28 10*6/mm3 4.84   Hemoglobin 12.0 - 15.9 g/dL 14.5   Hematocrit 34.0 - 46.6 % 42.5   RDW 12.3 - 15.4 % 11.8 (L)   MCV 79.0 - 97.0 fL 87.8   MCH 26.6 - 33.0 pg 30.0   MCHC 31.5 - 35.7 g/dL 34.1   MPV 6.0 - 12.0 fL 10.5   Platelets 140 - 450 10*3/mm3 287   RDW-SD 37.0 - 54.0 fl 37.5   (H): Data is abnormally high  (L): Data is abnormally low    Imaging:      Assessment / Plan      Assessment/Plan:   Diagnoses and all orders  for this visit:    1. Annual physical exam (Primary)  -     CBC (No Diff); Future  -     Comprehensive Metabolic Panel; Future  -     Hemoglobin A1c; Future  -     Lipid Panel; Future  -     TSH Rfx On Abnormal To Free T4; Future    2. Anxiety  -     sertraline (ZOLOFT) 50 MG tablet; Take 1 tablet by mouth Daily.  Dispense: 90 tablet; Refill: 3    3. Need for vaccination  -     COVID-19 Bivalent Booster (Pfizer) 12+yrs    4. Gastroesophageal reflux disease, unspecified whether esophagitis present  -controlled. Continue pepcid       Follow Up:   Return in about 1 year (around 2/17/2024) for Annual.    GEE Hickman  [facility] [place]    Transcribed from ambient dictation for GEE Giordano by Sole Jackson.  02/17/23   14:51 EST    Patient or patient representative verbalized consent to the visit recording.  I have personally performed the services described in this document as transcribed by the above individual, and it is both accurate and complete.

## 2023-08-02 ENCOUNTER — LAB (OUTPATIENT)
Dept: LAB | Facility: HOSPITAL | Age: 38
End: 2023-08-02
Payer: COMMERCIAL

## 2023-08-02 ENCOUNTER — PATIENT ROUNDING (BHMG ONLY) (OUTPATIENT)
Dept: FAMILY MEDICINE CLINIC | Facility: CLINIC | Age: 38
End: 2023-08-02
Payer: COMMERCIAL

## 2023-08-02 ENCOUNTER — OFFICE VISIT (OUTPATIENT)
Dept: FAMILY MEDICINE CLINIC | Facility: CLINIC | Age: 38
End: 2023-08-02
Payer: COMMERCIAL

## 2023-08-02 VITALS
HEART RATE: 58 BPM | HEIGHT: 65 IN | SYSTOLIC BLOOD PRESSURE: 122 MMHG | DIASTOLIC BLOOD PRESSURE: 84 MMHG | BODY MASS INDEX: 26.69 KG/M2 | WEIGHT: 160.2 LBS | OXYGEN SATURATION: 96 %

## 2023-08-02 DIAGNOSIS — F41.1 GAD (GENERALIZED ANXIETY DISORDER): Primary | ICD-10-CM

## 2023-08-02 DIAGNOSIS — Z00.00 WELL ADULT EXAM: ICD-10-CM

## 2023-08-02 LAB
ALBUMIN SERPL-MCNC: 4.7 G/DL (ref 3.5–5.2)
ALBUMIN/GLOB SERPL: 2.4 G/DL
ALP SERPL-CCNC: 43 U/L (ref 39–117)
ALT SERPL W P-5'-P-CCNC: 13 U/L (ref 1–33)
ANION GAP SERPL CALCULATED.3IONS-SCNC: 11.6 MMOL/L (ref 5–15)
AST SERPL-CCNC: 23 U/L (ref 1–32)
BILIRUB SERPL-MCNC: 0.5 MG/DL (ref 0–1.2)
BUN SERPL-MCNC: 10 MG/DL (ref 6–20)
BUN/CREAT SERPL: 11.5 (ref 7–25)
CALCIUM SPEC-SCNC: 9.1 MG/DL (ref 8.6–10.5)
CHLORIDE SERPL-SCNC: 105 MMOL/L (ref 98–107)
CHOLEST SERPL-MCNC: 153 MG/DL (ref 0–200)
CO2 SERPL-SCNC: 24.4 MMOL/L (ref 22–29)
CREAT SERPL-MCNC: 0.87 MG/DL (ref 0.57–1)
DEPRECATED RDW RBC AUTO: 39.3 FL (ref 37–54)
EGFRCR SERPLBLD CKD-EPI 2021: 88.7 ML/MIN/1.73
ERYTHROCYTE [DISTWIDTH] IN BLOOD BY AUTOMATED COUNT: 12.2 % (ref 12.3–15.4)
GLOBULIN UR ELPH-MCNC: 2 GM/DL
GLUCOSE SERPL-MCNC: 91 MG/DL (ref 65–99)
HBA1C MFR BLD: 5.1 % (ref 4.8–5.6)
HCT VFR BLD AUTO: 40.7 % (ref 34–46.6)
HDLC SERPL-MCNC: 51 MG/DL (ref 40–60)
HGB BLD-MCNC: 13.9 G/DL (ref 12–15.9)
LDLC SERPL CALC-MCNC: 92 MG/DL (ref 0–100)
LDLC/HDLC SERPL: 1.82 {RATIO}
MCH RBC QN AUTO: 30.3 PG (ref 26.6–33)
MCHC RBC AUTO-ENTMCNC: 34.2 G/DL (ref 31.5–35.7)
MCV RBC AUTO: 88.9 FL (ref 79–97)
PLATELET # BLD AUTO: 278 10*3/MM3 (ref 140–450)
PMV BLD AUTO: 11 FL (ref 6–12)
POTASSIUM SERPL-SCNC: 4.2 MMOL/L (ref 3.5–5.2)
PROT SERPL-MCNC: 6.7 G/DL (ref 6–8.5)
RBC # BLD AUTO: 4.58 10*6/MM3 (ref 3.77–5.28)
SODIUM SERPL-SCNC: 141 MMOL/L (ref 136–145)
TRIGL SERPL-MCNC: 45 MG/DL (ref 0–150)
TSH SERPL DL<=0.05 MIU/L-ACNC: 2.36 UIU/ML (ref 0.27–4.2)
VLDLC SERPL-MCNC: 10 MG/DL (ref 5–40)
WBC NRBC COR # BLD: 4.18 10*3/MM3 (ref 3.4–10.8)

## 2023-08-02 PROCEDURE — 80050 GENERAL HEALTH PANEL: CPT | Performed by: FAMILY MEDICINE

## 2023-08-02 PROCEDURE — 36415 COLL VENOUS BLD VENIPUNCTURE: CPT | Performed by: FAMILY MEDICINE

## 2023-08-02 PROCEDURE — 80061 LIPID PANEL: CPT | Performed by: FAMILY MEDICINE

## 2023-08-02 PROCEDURE — 83036 HEMOGLOBIN GLYCOSYLATED A1C: CPT | Performed by: FAMILY MEDICINE

## 2023-08-02 RX ORDER — OMEPRAZOLE 20 MG/1
20 CAPSULE, DELAYED RELEASE ORAL DAILY
COMMUNITY
Start: 2023-01-01

## 2023-08-02 RX ORDER — ESCITALOPRAM OXALATE 10 MG/1
10 TABLET ORAL DAILY
Qty: 90 TABLET | Refills: 3 | Status: SHIPPED | OUTPATIENT
Start: 2023-08-02

## 2023-08-25 ENCOUNTER — TELEPHONE (OUTPATIENT)
Age: 38
End: 2023-08-25
Payer: COMMERCIAL

## 2023-08-25 RX ORDER — AMOXICILLIN 875 MG/1
875 TABLET, COATED ORAL 2 TIMES DAILY
Qty: 14 TABLET | Refills: 0 | Status: SHIPPED | OUTPATIENT
Start: 2023-08-25 | End: 2023-09-01

## 2023-08-25 NOTE — TELEPHONE ENCOUNTER
Patient called after hours. She has a sore throat and it is swollen. Her  has strep throat. Requesting amoxicillin. Sent to pharmacy.

## 2023-09-01 ENCOUNTER — TELEPHONE (OUTPATIENT)
Age: 38
End: 2023-09-01
Payer: COMMERCIAL

## 2023-09-01 RX ORDER — FLUCONAZOLE 150 MG/1
150 TABLET ORAL ONCE
Qty: 2 TABLET | Refills: 1 | Status: SHIPPED | OUTPATIENT
Start: 2023-09-01 | End: 2023-09-01

## 2023-10-27 ENCOUNTER — OFFICE VISIT (OUTPATIENT)
Dept: OBSTETRICS AND GYNECOLOGY | Facility: CLINIC | Age: 38
End: 2023-10-27
Payer: COMMERCIAL

## 2023-10-27 VITALS
SYSTOLIC BLOOD PRESSURE: 104 MMHG | HEIGHT: 65 IN | DIASTOLIC BLOOD PRESSURE: 66 MMHG | WEIGHT: 157.4 LBS | BODY MASS INDEX: 26.23 KG/M2

## 2023-10-27 DIAGNOSIS — Z30.431 IUD CHECK UP: ICD-10-CM

## 2023-10-27 DIAGNOSIS — Z01.419 WOMEN'S ANNUAL ROUTINE GYNECOLOGICAL EXAMINATION: Primary | ICD-10-CM

## 2023-10-27 NOTE — PROGRESS NOTES
Gynecologic Annual Exam Note        Annual Exam        Subjective     HPI  Jazzy Negrete is a 38 y.o.  female who presents for annual well woman exam as a established patient. There were no changes to her medical or surgical history since her last visit.. Patient reports problems with: none. No LMP recorded (lmp unknown). Patient has had an implant..  Her periods are absent secondary to Mirena IUD . She reports dysmenorrhea is mild, occurring randomly. Partner Status: Marital Status: .  She is sexually active. She has not had new partners.. STD testing recommendations have been explained to the patient and she does not desire STD testing.    She has a small amt of spotting once a month when she wipes.     Additional OB/GYN History   Current contraception: contraceptive methods: IUD.  Insertion date: 2018  Desires to: continue contraception  Thromboembolic Disease: none  Age of menarche: 10    History of STD: no    Last Pap : 10/5/2021. Results: negative. HPV: negative.   Last Completed Pap Smear            PAP SMEAR (Every 3 Years) Next due on 10/5/2024      10/05/2021  SCANNED - PAP SMEAR    2019  Patient-Reported (Performed Externally)                     History of abnormal Pap smear: yes - HPV + in   Gardasil status:completed  Family history of uterine, colon, breast, or ovarian cancer: yes - MGGM had colon cancer  Performs monthly Self-Breast Exam: yes  Exercises Regularly:yes  Feelings of Anxiety or Depression: yes - both  Tobacco Usage?: No       Current Outpatient Medications:     escitalopram (Lexapro) 10 MG tablet, Take 1 tablet by mouth Daily., Disp: 90 tablet, Rfl: 3    tretinoin (RETIN-A) 0.05 % cream, Apply pea size amount topically to the face  as directed every night as needed., Disp: 45 g, Rfl: 11    Levonorgestrel (MIRENA) 20 MCG/DAY intrauterine device IUD, 1 each by Intrauterine route. Inserted 2018, Disp: , Rfl:      Patient denies the need for  "medication refills today.    OB History          5    Para   3    Term   2       1    AB   2    Living   2         SAB        IAB        Ectopic        Molar        Multiple        Live Births                    Health Maintenance   Topic Date Due    INFLUENZA VACCINE  2023    COVID-19 Vaccine ( - - season) 2023    Annual Gynecologic Pelvic and Breast Exam  10/22/2023    ANNUAL PHYSICAL  2024    BMI FOLLOWUP  2024    PAP SMEAR  10/05/2024    TDAP/TD VACCINES (2 - Td or Tdap) 2028    HEPATITIS C SCREENING  Completed    Pneumococcal Vaccine 0-64  Aged Out       Past Medical History:   Diagnosis Date    Abnormal ECG     Abnormal Pap smear of cervix     Anxiety     Cervical dysplasia     Depression     GERD (gastroesophageal reflux disease)     Gestational hypertension 2015    History of IUFD 2014    Genetic testing consistent with trisomy 21    HPV (human papilloma virus) infection     Hypertension 2015    Gestational    Recurrent pregnancy loss, antepartum condition or complication     SVT (supraventricular tachycardia)     Threatened miscarriage 2014    Varicella         Past Surgical History:   Procedure Laterality Date    CARDIAC ELECTROPHYSIOLOGY PROCEDURE      DILATATION AND CURETTAGE  2014    MAB @ 10 weeks    LASIK      WISDOM TOOTH EXTRACTION         The additional following portions of the patient's history were reviewed and updated as appropriate: allergies, current medications, past family history, past medical history, past social history, past surgical history, and problem list.    Review of Systems      I have reviewed and agree with the HPI, ROS, and historical information as entered above. Chely Sneed, APRN          Objective   /66   Ht 165.1 cm (65\")   Wt 71.4 kg (157 lb 6.4 oz)   LMP  (LMP Unknown) Comment: due to Mirena IUD  Breastfeeding No   BMI 26.19 kg/m²     Physical Exam  Vitals and " nursing note reviewed. Exam conducted with a chaperone present.   Constitutional:       General: She is not in acute distress.     Appearance: Normal appearance. She is well-developed. She is not ill-appearing.   Neck:      Thyroid: No thyroid mass or thyromegaly.   Pulmonary:      Effort: Pulmonary effort is normal. No respiratory distress or retractions.   Chest:      Chest wall: No mass.   Breasts:     Right: Normal. No mass, nipple discharge, skin change or tenderness.      Left: Normal. No mass, nipple discharge, skin change or tenderness.   Abdominal:      General: There is no distension.      Palpations: Abdomen is soft. Abdomen is not rigid. There is no mass.      Tenderness: There is no abdominal tenderness. There is no guarding or rebound.      Hernia: No hernia is present. There is no hernia in the left inguinal area.   Genitourinary:     General: Normal vulva.      Labia:         Right: No rash, tenderness or lesion.         Left: No rash, tenderness or lesion.       Vagina: Normal. No vaginal discharge or lesions.      Cervix: Normal.      Uterus: Normal. Not enlarged, not fixed and not tender.       Adnexa: Right adnexa normal and left adnexa normal.        Right: No mass or tenderness.          Left: No mass or tenderness.        Rectum: No external hemorrhoid.      Comments: IUD string approx 3cm  Musculoskeletal:      Cervical back: No muscular tenderness.   Skin:     General: Skin is warm and dry.   Neurological:      Mental Status: She is alert and oriented to person, place, and time.   Psychiatric:         Mood and Affect: Mood normal.         Behavior: Behavior normal.            Assessment and Plan    Problem List Items Addressed This Visit    None  Visit Diagnoses       Women's annual routine gynecological examination    -  Primary    IUD check up                GYN annual well woman exam.   Reviewed pap guidelines.   Pap due 2024  Reviewed monthly self breast exams.  Instructed to call with  lumps, pain, or breast discharge.    Reviewed exercise as a preventative health measures.   Reccommended Flu Vaccine in Fall of each year.  RTC in 1 year or PRN with problems  Return in about 1 year (around 10/27/2024) for Annual physical.  D/w pt if spotting becomes bothersome, we can switch out Mirena early.      Chely Sneed, APRN  10/27/2023

## 2023-11-13 ENCOUNTER — TELEPHONE (OUTPATIENT)
Age: 38
End: 2023-11-13
Payer: COMMERCIAL

## 2023-11-13 RX ORDER — AMOXICILLIN AND CLAVULANATE POTASSIUM 875; 125 MG/1; MG/1
1 TABLET, FILM COATED ORAL 2 TIMES DAILY
Qty: 10 TABLET | Refills: 0 | Status: SHIPPED | OUTPATIENT
Start: 2023-11-13 | End: 2023-11-18

## 2023-11-13 NOTE — TELEPHONE ENCOUNTER
She has had a sinus infection for the past 3 weeks with sinus pressure and burning.  She improves and then worsens.  Associated with nasal congestion and postnasal drainage.  Requesting Augmentin.

## 2023-12-11 ENCOUNTER — TELEPHONE (OUTPATIENT)
Age: 38
End: 2023-12-11
Payer: COMMERCIAL

## 2023-12-12 RX ORDER — MALATHION 0 G/ML
LOTION TOPICAL ONCE
Qty: 59 ML | Refills: 3 | Status: SHIPPED | OUTPATIENT
Start: 2023-12-12 | End: 2023-12-12

## 2023-12-12 NOTE — TELEPHONE ENCOUNTER
She has treated herself twice with Nix for head lice that her son Aníbal gave her.  She recently pulled out another bug.  She requested treatment other than permethrin since it has not been working.  She has washed and sprayed everything in her house.  Ovide sent to the [facility] pharmacy.

## 2024-02-26 ENCOUNTER — TELEPHONE (OUTPATIENT)
Age: 39
End: 2024-02-26
Payer: COMMERCIAL

## 2024-02-26 DIAGNOSIS — F41.1 GAD (GENERALIZED ANXIETY DISORDER): ICD-10-CM

## 2024-02-26 RX ORDER — ESCITALOPRAM OXALATE 10 MG/1
10 TABLET ORAL DAILY
Qty: 90 TABLET | Refills: 3 | Status: SHIPPED | OUTPATIENT
Start: 2024-02-26

## 2024-10-31 ENCOUNTER — OFFICE VISIT (OUTPATIENT)
Dept: OBSTETRICS AND GYNECOLOGY | Facility: CLINIC | Age: 39
End: 2024-10-31
Payer: COMMERCIAL

## 2024-10-31 VITALS
DIASTOLIC BLOOD PRESSURE: 78 MMHG | SYSTOLIC BLOOD PRESSURE: 116 MMHG | BODY MASS INDEX: 26.16 KG/M2 | HEIGHT: 65 IN | WEIGHT: 157 LBS

## 2024-10-31 DIAGNOSIS — Z30.09 FAMILY PLANNING EDUCATION, GUIDANCE, AND COUNSELING: ICD-10-CM

## 2024-10-31 DIAGNOSIS — Z01.419 WELL WOMAN EXAM WITH ROUTINE GYNECOLOGICAL EXAM: Primary | ICD-10-CM

## 2024-10-31 DIAGNOSIS — Z30.432 ENCOUNTER FOR IUD REMOVAL: ICD-10-CM

## 2024-10-31 NOTE — PROGRESS NOTES
Gynecologic Annual Exam Note        Gynecologic Exam (Annual ) and Contraception (Mirena removal)        Subjective     HPI  Jazzy Negrete is a 39 y.o.  female who presents for annual well woman exam as a established patient. There were no changes to her medical or surgical history since her last visit.. No LMP recorded (lmp unknown). Patient has had an implant. Her periods are absent secondary to birth control. The flow is absent. She denies dysmenorrhea. Marital Status: .  She is sexually active. She has not had new partners.. STD testing recommendations have been explained to the patient and she does not desire STD testing.    The patient would like to discuss the following complaints today: removal of Mirena, considering TTC and would like to discuss    Additional OB/GYN History   contraceptive methods: IUD.  Insertion date:  Mirena  Desires to: stop contraception  Thromboembolic Disease: none  History of migraines: no  Age of menarche: 10    History of STD: yes   +HPV colpo    Last Pap : 10/05/2021. Results: negative. HPV: negative.   Last Completed Pap Smear       This patient has no relevant Health Maintenance data.             History of abnormal Pap smear: yes -   Gardasil status:completed  Family history of uterine, colon, breast, or ovarian cancer: yes - [facility] colon cancer  Performs monthly Self-Breast Exam: sometimes  Exercises Regularly:yes  Feelings of Anxiety or Depression: yes - managed with medication  Tobacco Usage?: No       Current Outpatient Medications:     escitalopram (Lexapro) 10 MG tablet, Take 1 tablet by mouth Daily., Disp: 90 tablet, Rfl: 3    Levonorgestrel (MIRENA) 20 MCG/DAY intrauterine device IUD, To be inserted one time by prescriber. Route intrauterine.  Inserted 2018, Disp: , Rfl:     tretinoin (RETIN-A) 0.05 % cream, Apply pea size amount topically to the face  as directed every night as needed., Disp: 45 g, Rfl: 11     Patient denies the  need for medication refills today.    OB History          5    Para   3    Term   2       1    AB   2    Living   2         SAB        IAB        Ectopic        Molar        Multiple        Live Births                    Health Maintenance   Topic Date Due    ANNUAL PHYSICAL  2024    BMI FOLLOWUP  2024    INFLUENZA VACCINE  2024    COVID-19 Vaccine ( - - season) 2024    PAP SMEAR  10/05/2024    Annual Gynecologic Pelvic and Breast Exam  10/28/2024    TDAP/TD VACCINES (2 - Td or Tdap) 2028    HEPATITIS C SCREENING  Completed    Pneumococcal Vaccine 0-64  Aged Out       Past Medical History:   Diagnosis Date    Abnormal ECG     Abnormal Pap smear of cervix     Anxiety     Cervical dysplasia     Compound heterozygous MTHFR mutation [id number]     Depression     GERD (gastroesophageal reflux disease)     Gestational hypertension 2015    History of IUFD 2014    Genetic testing consistent with trisomy 21    HPV (human papilloma virus) infection     Hypertension 2015    Gestational    Recurrent pregnancy loss, antepartum condition or complication     SVT (supraventricular tachycardia)     Threatened miscarriage 2014    Varicella         Past Surgical History:   Procedure Laterality Date    CARDIAC ELECTROPHYSIOLOGY PROCEDURE      DILATATION AND CURETTAGE  2014    MAB @ 10 weeks    LASIK      WISDOM TOOTH EXTRACTION         The additional following portions of the patient's history were reviewed and updated as appropriate: allergies, current medications, past family history, past medical history, past social history, past surgical history, and problem list.    Review of Systems   Constitutional: Negative.    HENT: Negative.     Eyes: Negative.    Respiratory: Negative.     Cardiovascular: Negative.    Gastrointestinal: Negative.    Endocrine: Negative.    Genitourinary: Negative.    Musculoskeletal: Negative.    Skin:  "Negative.    Allergic/Immunologic: Negative.    Neurological: Negative.    Hematological: Negative.    Psychiatric/Behavioral: Negative.           I have reviewed and agree with the HPI, ROS, and historical information as entered above. Britney Vanegas, APRN          Objective   /78   Ht 165.1 cm (65\")   Wt 71.2 kg (157 lb)   LMP  (LMP Unknown)   BMI 26.13 kg/m²     Physical Exam  Vitals and nursing note reviewed. Exam conducted with a chaperone present.   Constitutional:       General: She is not in acute distress.     Appearance: Normal appearance. She is well-developed. She is not ill-appearing, toxic-appearing or diaphoretic.   HENT:      Head: Normocephalic and atraumatic.   Neck:      Thyroid: No thyroid mass or thyromegaly.   Cardiovascular:      Rate and Rhythm: Normal rate.      Heart sounds: No murmur heard.  Pulmonary:      Effort: Pulmonary effort is normal. No respiratory distress or retractions.   Chest:      Chest wall: No mass.   Breasts:     Right: Normal. No mass, nipple discharge, skin change or tenderness.      Left: Normal. No mass, nipple discharge, skin change or tenderness.   Abdominal:      General: There is no distension.      Palpations: Abdomen is soft. Abdomen is not rigid. There is no mass.      Tenderness: There is no abdominal tenderness. There is no guarding or rebound.      Hernia: No hernia is present.   Genitourinary:     General: Normal vulva.      Exam position: Lithotomy position.      Labia:         Right: No rash, tenderness or lesion.         Left: No rash, tenderness or lesion.       Vagina: Normal. No vaginal discharge or lesions.      Cervix: Normal. No cervical motion tenderness, discharge, lesion or cervical bleeding.      Uterus: Normal. Not enlarged, not fixed and not tender.       Adnexa: Right adnexa normal and left adnexa normal.        Right: No mass or tenderness.          Left: No mass or tenderness.        Rectum: Normal. No external hemorrhoid. "   Musculoskeletal:      Cervical back: Normal range of motion. No muscular tenderness.   Skin:     General: Skin is warm and dry.   Neurological:      Mental Status: She is alert and oriented to person, place, and time.   Psychiatric:         Mood and Affect: Mood normal.         Behavior: Behavior normal. Behavior is cooperative.         Thought Content: Thought content normal.         Judgment: Judgment normal.            Assessment and Plan    Problem List Items Addressed This Visit    None  Visit Diagnoses       Well woman exam with routine gynecological exam    -  Primary    Relevant Orders    LIQUID-BASED PAP SMEAR WITH HPV GENOTYPING REGARDLESS OF INTERPRETATION (MONIQUE,COR,MAD)    Encounter for IUD removal        Family planning education, guidance, and counseling                GYN annual well woman exam.   Reviewed pap guidelines. Pap today.  Reviewed risks/benefits of hormonal contraception after age 35, including possible increased risk of breast cancer, heart disease, blood clots and strokes.  Patient strongly desires to stay on hormonal contraception.  Fibrocystic breast changes - Encouraged decreasing caffeine, supportive bra, low dose vitamin E supplementation.  Reviewed monthly self breast exams.  Instructed to call with lumps, pain, or breast discharge.    Reviewed BMI and weight loss as preventative health measures.   Reviewed exercise as a preventative health measures.   Preconceptual Counseling - Discussed cystic fibrosis screening, rubella screening, chicken pox immunity, folic acid supplementation and HIV screening.   We discussed trying to conceive. Taking prenatal with folic acid every day. Continue healthy diet and exercise. Stop alcohol and smoking if currently using. May use ovulation kits and cycle tracking. Call if any cramping or bleeding occur after positive pregnancy test. Patient verbalized understand and plan of care.  We also discussed risks of AMA and previous diagnosis of  MTFR.    Britney Vanegas, APRN  10/31/2024

## 2024-10-31 NOTE — PROGRESS NOTES
"     Gynecologic Procedure Note        Procedure: IUD Removal     Procedures    Pre procedure indication     1) Desires Removal of IUD    Post procedure indication   1) Desires Removal of IUD    Ht 165.1 cm (65\")   Wt 71.2 kg (157 lb)   LMP  (LMP Unknown)   BMI 26.13 kg/m²       The patient presents today for removal of her IUD.  She requests removal of her IUD due to Desires pregnancy. She plans to use no method for contraception. All her questions were answered and consents were signed.    Time out: immediate members of the procedure team and patient agree to the following: correct patient, correct site, correct procedure to be performed. Britney FLYNN/Araseli HARDWICK      The patient was placed in a dorsal lithotomy position on the examining table in stirps.  A speculum was inserted into the vagina and the cervix was brought into view.  An IUD string was visualized.  The string was then grasped and removed intact with gentle traction.  There was a Small amount of bleeding and cramping.    All  instruments were removed from the vagina.       The patient tolerated the procedure very well with a mild amount of discomfort until she sat up. Patient became lightheaded and stated she had ringing in her ears. Head lowered cold washcloth to neck, ice water to drink, fanning. She improved in less than 5 minutes. She was monitored for 20 minutes prior to discharge.      There were no complications.    The patient was counseled on other options for birth control. She plans to use condoms for contraception.     Problem List Items Addressed This Visit    None  Visit Diagnoses       Well woman exam with routine gynecological exam    -  Primary              Ann Campos RN  10/31/2024  "

## 2024-11-01 DIAGNOSIS — B49 YEAST CELLS AND FUNGAL ELEMENTS PRESENT ON DIAGNOSTIC TESTING: Primary | ICD-10-CM

## 2024-11-01 DIAGNOSIS — B37.9 YEAST CELLS AND FUNGAL ELEMENTS PRESENT ON DIAGNOSTIC TESTING: Primary | ICD-10-CM

## 2024-11-01 LAB — REF LAB TEST METHOD: NORMAL

## 2024-11-01 RX ORDER — FLUCONAZOLE 150 MG/1
TABLET ORAL
Qty: 2 TABLET | Refills: 0 | Status: SHIPPED | OUTPATIENT
Start: 2024-11-01

## 2024-11-05 ENCOUNTER — OFFICE VISIT (OUTPATIENT)
Age: 39
End: 2024-11-05
Payer: COMMERCIAL

## 2024-11-05 VITALS
OXYGEN SATURATION: 98 % | HEIGHT: 65 IN | WEIGHT: 158.13 LBS | RESPIRATION RATE: 18 BRPM | BODY MASS INDEX: 26.35 KG/M2 | HEART RATE: 74 BPM | DIASTOLIC BLOOD PRESSURE: 70 MMHG | SYSTOLIC BLOOD PRESSURE: 110 MMHG

## 2024-11-05 DIAGNOSIS — F41.1 GAD (GENERALIZED ANXIETY DISORDER): ICD-10-CM

## 2024-11-05 DIAGNOSIS — K21.9 GASTROESOPHAGEAL REFLUX DISEASE, UNSPECIFIED WHETHER ESOPHAGITIS PRESENT: ICD-10-CM

## 2024-11-05 DIAGNOSIS — Z00.00 WELL ADULT EXAM: Primary | ICD-10-CM

## 2024-11-05 PROCEDURE — 99395 PREV VISIT EST AGE 18-39: CPT | Performed by: FAMILY MEDICINE

## 2024-11-05 RX ORDER — ESCITALOPRAM OXALATE 10 MG/1
10 TABLET ORAL DAILY
Qty: 90 TABLET | Refills: 3 | Status: SHIPPED | OUTPATIENT
Start: 2024-11-05

## 2024-11-05 RX ORDER — TRETINOIN 0.5 MG/G
1 CREAM TOPICAL
Qty: 45 G | Refills: 11 | Status: SHIPPED | OUTPATIENT
Start: 2024-11-05

## 2024-11-05 RX ORDER — OMEPRAZOLE 40 MG/1
40 CAPSULE, DELAYED RELEASE ORAL DAILY
Qty: 90 CAPSULE | Refills: 3 | Status: SHIPPED | OUTPATIENT
Start: 2024-11-05

## 2024-11-05 RX ORDER — BIMATOPROST 3 UG/ML
SOLUTION TOPICAL
Qty: 5 ML | Refills: 12 | Status: SHIPPED | OUTPATIENT
Start: 2024-11-05

## 2024-11-05 NOTE — PROGRESS NOTES
Chief Complaint  Annual Exam    Peggy Negrete presents to [facility] for   History of Present Illness  History of Present Illness  The patient is a 39-year-old female who presents for an annual physical exam today.    She recently had her Mirena IUD removed at the OB/GYN office and is considering trying for another child.  During the IUD removal, she experienced a near-fainting episode, which required a 10-minute recovery period. Since the removal, she has not had a menstrual cycle.    She completed a half marathon on the first Jarrell of September 2024 and is contemplating participating in a turkey trot on November 11, 2024. She has been maintaining a running routine of 3 miles, 3 to 4 days a week but has been unable to continue due to travel. She has been focusing on a healthier diet, incorporating more fruits, vegetables, and healthy carbohydrates, with the goal of achieving a normal BMI and reducing her risk for diabetes, high blood pressure, and high cholesterol.    She is currently taking Lexapro 10 mg for mental health management and is considering switching to Wellbutrin if she decides to conceive. She has experienced withdrawal symptoms when not taking Lexapro.    She is experiencing heartburn, which she believes is triggered by coffee consumption. She has been managing this with over-the-counter omeprazole 20 mg twice daily.    Her last dental cleaning was in June 2024, and she is scheduled for another in December 2024. She has an upcoming dermatology appointment for a skin check.    She accidentally injured her eye yesterday and was informed that it is healing. She was advised to use Systane twice a day due to her previous LASIK surgery.    FAMILY HISTORY  She denies any family history of skin cancer.    IMMUNIZATIONS  Her tetanus is current from 2018. She received her seasonal influenza vaccine this year.    Objective   Vital Signs:   There were no  vitals filed for this visit.  There is no height or weight on file to calculate BMI.    BMI is >= 25 and <30. (Overweight) The following options were offered after discussion;: exercise counseling/recommendations and nutrition counseling/recommendations        The following portions of the patient's history were reviewed and updated as appropriate: allergies, current medications, past family history, past medical history, past social history, past surgical history, and problem list.      Physical Exam  Constitutional:       General: She is not in acute distress.     Appearance: She is not toxic-appearing.   HENT:      Right Ear: Tympanic membrane and ear canal normal.      Left Ear: Tympanic membrane and ear canal normal.      Nose: No congestion or rhinorrhea.      Mouth/Throat:      Mouth: Mucous membranes are moist.      Pharynx: No oropharyngeal exudate or posterior oropharyngeal erythema.   Eyes:      General:         Right eye: No discharge.         Left eye: No discharge.      Conjunctiva/sclera: Conjunctivae normal.   Neck:      Thyroid: No thyromegaly.   Cardiovascular:      Rate and Rhythm: Normal rate and regular rhythm.   Pulmonary:      Effort: Pulmonary effort is normal.      Breath sounds: Normal breath sounds.   Abdominal:      Palpations: Abdomen is soft. There is no hepatomegaly.      Tenderness: There is no abdominal tenderness.   Musculoskeletal:      Cervical back: Neck supple.   Lymphadenopathy:      Head:      Right side of head: No submandibular, preauricular or posterior auricular adenopathy.      Left side of head: No submandibular, preauricular or posterior auricular adenopathy.      Cervical: No cervical adenopathy.   Skin:     General: Skin is warm.   Neurological:      Mental Status: She is alert and oriented to person, place, and time.      Gait: Gait normal.   Psychiatric:         Mood and Affect: Mood normal.         Behavior: Behavior normal.         Thought Content: Thought content  normal.         Judgment: Judgment normal.        Result Review :                Immunization History   Administered Date(s) Administered    COVID-19 (MODERNA) 1st,2nd,3rd Dose Monovalent 12/30/2020, 01/27/2021    COVID-19 (PFIZER) BIVALENT 12+YRS 02/17/2023    COVID-19 (PFIZER) Purple Cap Monovalent 12/10/2021    Fluzone (or Fluarix & Flulaval for VFC) >6mos 09/22/2020, 10/14/2022    Fluzone Quad >6mos (Multi-dose) 10/17/2012    Influenza, Unspecified 10/29/2011    Meningococcal Polysaccharide 09/14/2004    PPD Test 09/27/2011, 09/26/2012    Tdap 03/05/2018       Health Maintenance   Topic Date Due    ANNUAL PHYSICAL  02/17/2024    BMI FOLLOWUP  02/17/2024    INFLUENZA VACCINE  08/01/2024    COVID-19 Vaccine (5 - 2024-25 season) 09/01/2024    PAP SMEAR  10/31/2027    TDAP/TD VACCINES (2 - Td or Tdap) 03/05/2028    HEPATITIS C SCREENING  Completed    Pneumococcal Vaccine 0-64  Aged Out            Assessment and Plan    There are no diagnoses linked to this encounter.    Assessment & Plan  1. Annual physical exam.  Her lab results from the previous year were within normal limits.  She received labs as part of life insurance and will fax the results to the office.  She received her seasonal flu vaccine through work. The COVID-19 vaccine is available in the office, and she was given the option to receive it today or at a pharmacy closer to home. A prescription for Retin-A and Latisse was provided.     2.  Anxiety.  Stable. Continuation of Lexapro 10 mg was recommended.     3. Heartburn.  Stable. A prescription for omeprazole 40 mg once daily was provided. She reported that her heartburn is likely related to coffee consumption.          Counseling/anticipatory guidance: Nutrition, physical activity, healthy weight, dental health, mental health, eye exam, immunizations      Follow Up   No follow-ups on file.  Patient was given instructions and counseling regarding her condition or for health maintenance advice. Please  see specific information pulled into the AVS if appropriate.     Patient or patient representative verbalized consent for the use of Ambient Listening during the visit with  Maddie Soriano MD for chart documentation. 11/5/2024  14:29 EST     Electronically signed by Maddie Soriano MD, 11/05/24, 2:29 PM EST.

## 2025-03-27 ENCOUNTER — TELEPHONE (OUTPATIENT)
Dept: OBSTETRICS AND GYNECOLOGY | Facility: CLINIC | Age: 40
End: 2025-03-27
Payer: COMMERCIAL

## 2025-03-27 NOTE — TELEPHONE ENCOUNTER
Provider: DR GARDNER    Caller: Jazzy Negrete    Relationship to Patient: Self    Phone Number: [phone]    Reason for Call: OB PT CALLED STATED SHE WAS BEING SEEN @ [facility] FOR THIS PREGNANCY; PT WANTS TO COME BACK TO [place]; LMP 11/30; PT WAS LAST SEEN HERE BY GEE COREA ON 10/31/24; PT HAS SEEN DR GARDNER IN THE PAST.  PT MEDICAL RECORDS ARE IN CARE EVERYWHERE;  PT STATED SHE HAS AN ANATOMY SCAN SCHEDULED WITH [facility] ON 04/21.    PLEASE LOOK OVER RECORDS AND CALL PT TO SCHEDULE.

## 2025-04-08 ENCOUNTER — INITIAL PRENATAL (OUTPATIENT)
Dept: OBSTETRICS AND GYNECOLOGY | Facility: CLINIC | Age: 40
End: 2025-04-08
Payer: COMMERCIAL

## 2025-04-08 VITALS — BODY MASS INDEX: 29.72 KG/M2 | DIASTOLIC BLOOD PRESSURE: 84 MMHG | SYSTOLIC BLOOD PRESSURE: 126 MMHG | WEIGHT: 178.6 LBS

## 2025-04-08 DIAGNOSIS — O09.522 MULTIGRAVIDA OF ADVANCED MATERNAL AGE IN SECOND TRIMESTER: ICD-10-CM

## 2025-04-08 DIAGNOSIS — Z14.1 CYSTIC FIBROSIS CARRIER: ICD-10-CM

## 2025-04-08 DIAGNOSIS — N96 HISTORY OF MULTIPLE MISCARRIAGES: ICD-10-CM

## 2025-04-08 DIAGNOSIS — Z87.59 HISTORY OF IUFD: ICD-10-CM

## 2025-04-08 DIAGNOSIS — Z15.89 COMPOUND HETEROZYGOUS MTHFR MUTATION C677T/A1298C: ICD-10-CM

## 2025-04-08 DIAGNOSIS — Z36.89 ENCOUNTER FOR FETAL ANATOMIC SURVEY: ICD-10-CM

## 2025-04-08 DIAGNOSIS — Z34.90 PRENATAL CARE, ANTEPARTUM, UNSPECIFIED GRAVIDITY: Primary | ICD-10-CM

## 2025-04-08 RX ORDER — PRENATAL VIT NO.126/IRON/FOLIC 28MG-0.8MG
1 TABLET ORAL DAILY
COMMUNITY

## 2025-04-08 RX ORDER — ASPIRIN 81 MG/1
81 TABLET, CHEWABLE ORAL DAILY
COMMUNITY

## 2025-04-08 RX ORDER — FAMOTIDINE 20 MG/1
20 TABLET, FILM COATED ORAL DAILY
COMMUNITY

## 2025-04-08 NOTE — PROGRESS NOTES
Initial ob visit     CC- Here for care of pregnancy        Jazzy Negrete is a 39 y.o. female, , who presents for her first obstetrical visit.  Patient's last menstrual period was 2024 (exact date).. Her KETAN is 2025, by Last Menstrual Period. Current GA is 18w3d. She is a transfer from [facility]. She has anatomy scan scheduled at [facility] on . She reports having spotting at 13-14w secondary to constipation. Denies vaginal bleeding since then. No complaints today.    Initial positive test date : 24, UPT        Her periods are every 28 days.  Prior obstetric issues: gestational HTN, recurrent SAB  Patient's past medical history is significant for: No.  Family history of genetic issues (includes FOB): down syndrome in G2 delivered at 20w.  Prior infections concerning in pregnancy (Rash, fever in last 2 weeks): No  Varicella Hx - history of chicken pox  Prior testing for Cystic Fibrosis Carrier or Sickle Cell Trait- No.  Prepregnancy BMI - Body mass index is 29.72 kg/m².  History of STD: no  Hx of HSV for patient or partner: no  Ultrasound Today: no    OB History    Para Term  AB Living   6 3 2 1 2 2   SAB IAB Ectopic Molar Multiple Live Births              # Outcome Date GA Lbr Edilson/2nd Weight Sex Type Anes PTL Lv   6 Current            5 Term            4 Term            3 AB            2   20w0d          1 AB                Additional Pertinent History   Last Pap : 10/31/24 Result: negative HPV: negative  Last Completed Pap Smear            Upcoming       PAP SMEAR (Every 3 Years) Next due on 10/31/2027      10/31/2024  LIQUID-BASED PAP SMEAR WITH HPV GENOTYPING REGARDLESS OF INTERPRETATION (MONIQUE,COR,MAD)    10/05/2021  SCANNED - PAP SMEAR    2019  Patient-Reported (Performed Externally)                          History of abnormal Pap smear: yes - colpo, normal since then.   Family history of uterine, colon, breast, or ovarian  cancer: no  Feelings of Anxiety or Depression: no  Tobacco Usage?: No   Alcohol/Drug Use?: NO  Over the age of 35 at delivery: yes  Genetic Screening: she had cell free DNA and nuchal translucency done at , negative results. She was told that baby is CF carrier and will need to be tested postpartum. She denies family history of CF.    PMH    Current Outpatient Medications:     aspirin 81 MG chewable tablet, Chew 1 tablet Daily., Disp: , Rfl:     escitalopram (Lexapro) 10 MG tablet, Take 1 tablet by mouth Daily., Disp: 90 tablet, Rfl: 3    famotidine (PEPCID) 20 MG tablet, Take 1 tablet by mouth Daily., Disp: , Rfl:     prenatal vitamin (prenatal, CLASSIC, vitamin) tablet, Take 1 tablet by mouth Daily., Disp: , Rfl:      Past Medical History:   Diagnosis Date    Abnormal ECG 2018    Abnormal Pap smear of cervix 2008    Anxiety     Cervical dysplasia     Compound heterozygous MTHFR mutation [id number] 2015    Depression     GERD (gastroesophageal reflux disease) 2020    Gestational hypertension 08/2015    Gestational hypertension 08/2015    History of IUFD 04/09/2014    Genetic testing consistent with trisomy 21    HPV (human papilloma virus) infection 2008    Hypertension August 2015    Gestational    Loss of hair 05/31/2016    Recurrent pregnancy loss, antepartum condition or complication     SVT (supraventricular tachycardia)     Threatened miscarriage 01/03/2014    Varicella         Past Surgical History:   Procedure Laterality Date    CARDIAC ELECTROPHYSIOLOGY PROCEDURE      DILATATION AND CURETTAGE  08/04/2014    MAB @ 10 weeks    LASIK      WISDOM TOOTH EXTRACTION         Review of Systems   Review of Systems   All other systems reviewed and are negative.      Patient Reports: Constipation, taking Miralax intermittently.  Patient Denies:excessive nausea , excessive vomiting, and vaginal bleeding  All systems reviewed and otherwise normal.    I have reviewed and agree with the HPI, ROS, and historical  information as entered above. Britney Vanegas, APRN      /84   Wt 81 kg (178 lb 9.6 oz)   LMP 2024 (Exact Date)   BMI 29.72 kg/m²     The additional following portions of the patient's history were reviewed and updated as appropriate: allergies, current medications, past family history, past medical history, past social history, past surgical history, and problem list.    Physical Exam  General:  well developed; well nourished  no acute distress  mentation appropriate   Chest/Respiratory: No labored breathing, normal respiratory effort, normal appearance, no respiratory noises noted   Heart:  not examined   Thyroid: not examined   Breasts:  Not performed.   Abdomen: soft, non-tender; no masses  no umbilical or inguinal hernias are present   Pelvis: Not performed.        Assessment and Plan    Problem List Items Addressed This Visit          Chromosomal and Congenital     Cystic fibrosis carrier    Relevant Orders    [facility] [place]       Endocrine and Metabolic    Compound heterozygous MTHFR mutation [id number]    Relevant Orders    [facility] [place]       Gravid and     Multigravida of advanced maternal age in second trimester    Overview   Baby aspirin 81mg daily         Relevant Orders    [facility] [place]    History of IUFD    Overview   Genetic testing consistent with trisomy 21         Relevant Orders    [facility] [place]    History of multiple miscarriages    Relevant Orders    [facility] [place]     Other Visit Diagnoses         Prenatal care, antepartum, unspecified     -  Primary    Relevant Orders    POC Urinalysis Dipstick      Encounter for fetal anatomic survey        Relevant Orders    [facility] [place]            Pregnancy at 18w3d  Reviewed routine prenatal care with the office and educational materials given  Discussed options for genetic testing including first  trimester nuchal translucency screen, genetic disease carrier testing, quadruple screen, and NIPT  Discontinue the use of all non-medicinal drugs and chemicals  U/S ordered at follow up  discussed baby aspirin from 12 weeks to delivery for prevention of preeclampsia   Follow up with [facility] for anatomy ultrasound and see LOS after.      Britney Vanegas, APRN  04/08/2025

## 2025-04-17 DIAGNOSIS — F41.1 GAD (GENERALIZED ANXIETY DISORDER): ICD-10-CM

## 2025-04-18 RX ORDER — ESCITALOPRAM OXALATE 10 MG/1
10 TABLET ORAL DAILY
Qty: 90 TABLET | Refills: 3 | Status: SHIPPED | OUTPATIENT
Start: 2025-04-18

## 2025-04-18 NOTE — TELEPHONE ENCOUNTER
Rx Refill Note  Requested Prescriptions     Pending Prescriptions Disp Refills    escitalopram (Lexapro) 10 MG tablet 90 tablet 3     Sig: Take 1 tablet by mouth Daily.      Last office visit with prescribing clinician: 11/5/2024   Last telemedicine visit with prescribing clinician: Visit date not found   Next office visit with prescribing clinician: 11/7/2025       Mary Butler MA  04/18/25, 08:58 EDT

## 2025-04-24 ENCOUNTER — HOSPITAL ENCOUNTER (OUTPATIENT)
Dept: WOMENS IMAGING | Facility: HOSPITAL | Age: 40
Discharge: HOME | End: 2025-04-24
Payer: COMMERCIAL

## 2025-04-24 ENCOUNTER — ROUTINE PRENATAL (OUTPATIENT)
Dept: OBSTETRICS AND GYNECOLOGY | Facility: CLINIC | Age: 40
End: 2025-04-24
Payer: COMMERCIAL

## 2025-04-24 ENCOUNTER — OFFICE VISIT (OUTPATIENT)
Dept: OBSTETRICS AND GYNECOLOGY | Facility: HOSPITAL | Age: 40
End: 2025-04-24
Payer: COMMERCIAL

## 2025-04-24 VITALS — DIASTOLIC BLOOD PRESSURE: 77 MMHG | BODY MASS INDEX: 29.95 KG/M2 | WEIGHT: 180 LBS | SYSTOLIC BLOOD PRESSURE: 119 MMHG

## 2025-04-24 VITALS — DIASTOLIC BLOOD PRESSURE: 62 MMHG | SYSTOLIC BLOOD PRESSURE: 108 MMHG | WEIGHT: 178.8 LBS | BODY MASS INDEX: 29.75 KG/M2

## 2025-04-24 DIAGNOSIS — Z15.89 COMPOUND HETEROZYGOUS MTHFR MUTATION C677T/A1298C: ICD-10-CM

## 2025-04-24 DIAGNOSIS — Z87.59 HISTORY OF IUFD: ICD-10-CM

## 2025-04-24 DIAGNOSIS — N96 HISTORY OF MULTIPLE MISCARRIAGES: ICD-10-CM

## 2025-04-24 DIAGNOSIS — Z14.1 CYSTIC FIBROSIS CARRIER: ICD-10-CM

## 2025-04-24 DIAGNOSIS — O09.522 MULTIGRAVIDA OF ADVANCED MATERNAL AGE IN SECOND TRIMESTER: ICD-10-CM

## 2025-04-24 DIAGNOSIS — Z34.90 PREGNANCY, UNSPECIFIED GESTATIONAL AGE: ICD-10-CM

## 2025-04-24 DIAGNOSIS — O09.522 MULTIGRAVIDA OF ADVANCED MATERNAL AGE IN SECOND TRIMESTER: Primary | ICD-10-CM

## 2025-04-24 DIAGNOSIS — Z36.89 ENCOUNTER FOR FETAL ANATOMIC SURVEY: ICD-10-CM

## 2025-04-24 DIAGNOSIS — Z34.82 PRENATAL CARE, SUBSEQUENT PREGNANCY IN SECOND TRIMESTER: Primary | ICD-10-CM

## 2025-04-24 LAB
GLUCOSE UR STRIP-MCNC: NEGATIVE MG/DL
PROT UR STRIP-MCNC: NEGATIVE MG/DL

## 2025-04-24 PROCEDURE — 76811 OB US DETAILED SNGL FETUS: CPT

## 2025-04-24 NOTE — PROGRESS NOTES
Patient denies any leaking fluid, vaginal bleeding, or contractions.  NIPT negative.  Patient reports next follow-up appointment with KERRI Vanegas's office is today.

## 2025-04-24 NOTE — ASSESSMENT & PLAN NOTE
The patient will be 39 years old at the time of delivery.  She was quoted the following age-related risks at term:  Risk for Down syndrome 1 in 175, risk for any chromosomal abnormality 1 in 100.  Options in genetic testing and genetic screening were discussed with the patient.  I noted an option of genetic testing in the 2nd trimester of transabdominal amniocentesis for the determination of fetal chromosomal complement as well as amniotic fluid alpha-fetoprotein for the evaluation of a fetal open neural tube defect.  A procedure-related fetal loss rate of 1 in 500 would be quoted with midtrimester amniocentesis.  I also discussed the option of analysis of cell-free fetal DNA in maternal blood.  I noted this directed analysis measures the relative proportion of chromosomes with the detection rate of fetal Down syndrome quoted as 99% with a false positive rate of less than .1%.  Screening for other fetal aneuploidies is also possible but the detection rate is lower with cell-free fetal DNA technology.  I then discussed the clinical utility of ultrasound and/or biochemical screening for the detection of fetal aneuploidy as well as fetal open neural tube defects.  Further, I have reviewed her self-reported family history and made suggestions as appropriate based on my review.      Patient is already had a cell free DNA screen that returned as low risk.  We discussed that this significantly lowers the risk of chromosomal abnormalities but there is no eliminate risk.  Amniocentesis was again discussed.  The procedure was explained the risks including risk of pregnancy loss were outlined.  After careful consideration the patient declines amniocentesis.

## 2025-04-24 NOTE — PROGRESS NOTES
OB FOLLOW UP  CC- Here for care of pregnancy        Jazzy Negrete is a 39 y.o.  20w5d patient being seen today for her obstetrical follow up visit. Patient reports no complaints.     Her prenatal care is complicated by (and status) : see below.  Patient Active Problem List   Diagnosis    Disorder of sulfur-bearing amino acid metabolism    Mitral valve prolapse    SVT (supraventricular tachycardia)    Anxiety    Gastroesophageal reflux disease    Multigravida of advanced maternal age in second trimester    Cystic fibrosis carrier    Compound heterozygous MTHFR mutation C677T/[id number]    Gestational hypertension    History of IUFD    History of multiple miscarriages       Ultrasound Today: Yes with PDC. Per PDC- Size consistent with dates. No fetal anomalies were identified. No fetal markers for trisomy. The cervical length appears normal.     AFP was already completed and was normal. Performed at .    ROS -     Patient Denies: leaking of fluid, vaginal bleeding, dysuria, excessive vomiting, and more than 6 contractions per hour  Fetal Movement : Yes  Other than what is documented in the HPI, all other systems reviewed and are negative.       The additional following portions of the patient's history were reviewed and updated as appropriate: allergies, current medications, and problem list.      I have reviewed and agree with the HPI, ROS, and historical information as entered above. Crista Hackett MD      /62   Wt 81.1 kg (178 lb 12.8 oz)   LMP 2024 (Exact Date)   BMI 29.75 kg/m²       EXAM:     Prenatal Vitals  BP: 108/62  Weight: 81.1 kg (178 lb 12.8 oz)   Fetal Heart Rate: pos          Urine Glucose Read-only: Negative  Urine Protein Read-only: Negative       Assessment and Plan    Problem List Items Addressed This Visit          Chromosomal and Congenital     Cystic fibrosis carrier    Relevant Orders    POC Urinalysis Dipstick (Completed)       Endocrine and Metabolic    Compound  heterozygous MTHFR mutation [id number]    Relevant Orders    POC Urinalysis Dipstick (Completed)       Gravid and     Multigravida of advanced maternal age in second trimester    Overview   Baby aspirin 81mg daily         Relevant Orders    POC Urinalysis Dipstick (Completed)    History of IUFD    Overview   G2 at 20 weeks- Genetic testing +Trisomy 21         Relevant Orders    POC Urinalysis Dipstick (Completed)    History of multiple miscarriages    Relevant Orders    POC Urinalysis Dipstick (Completed)     Other Visit Diagnoses         Prenatal care, subsequent pregnancy in second trimester    -  Primary    Relevant Orders    POC Urinalysis Dipstick (Completed)            Pregnancy at 20w5d  Anatomy scan today is complete and appear within normal limits.  Fetal status reassuring.   Activity and Exercise discussed.  Patient is on Prenatal vitamins  Return in about 4 weeks (around 2025).    Crista Hackett MD  2025

## 2025-04-24 NOTE — LETTER
2025     GEE Mullen  [address] [facility]  [address] [place] [id number]    Patient: Jazzy Negrete   YOB: 1985   Date of Visit: 2025     Dear GEE Mullen:       Thank you for referring Jazzy Negrete to me for evaluation. Below are the relevant portions of my assessment and plan of care.    If you have questions, please do not hesitate to call me. I look forward to following Jazzy along with you.         Sincerely,        Douglas A. Milligan, MD        CC: No Recipients    Milligan, Douglas A, MD  [phone]  Sign when Signing Visit  Documentation of the ultrasound findings, images, and interpretations will be available in the patient's Viewpoint report which is located in the imaging tab in chart review.    Maternal/Fetal Medicine Consult Note     Name: Jazzy Negrete    : 1985     MRN: [id number]     Referring Provider: GEE Mullen    Chief Complaint  AMA; Hx IUFD@20wk; MTHFR; CF carrier; Hx GTHN    Subjective     History of Present Illness:  Jazzy Negrete is a 39 y.o.  20w5d who presents today for AMA, MTHFR    KETAN: Estimated Date of Delivery: 25     ROS:   As noted in HPI.     Past Medical History:   Diagnosis Date   • Abnormal ECG    • Abnormal Pap smear of cervix    • Anxiety    • Cervical dysplasia    • Compound heterozygous MTHFR mutation C677T/[id number]    • Depression    • GERD (gastroesophageal reflux disease)    • Gestational hypertension 2015   • Gestational hypertension 2015   • History of IUFD 2014    Genetic testing consistent with trisomy 21   • HPV (human papilloma virus) infection    • Hypertension 2015    Gestational   • Loss of hair 2016   • Recurrent pregnancy loss, antepartum condition or complication    • SVT (supraventricular tachycardia)    • Threatened miscarriage 2014   • Varicella       Past Surgical History:   Procedure Laterality Date   • CARDIAC  "ELECTROPHYSIOLOGY PROCEDURE     • DILATATION AND CURETTAGE  2014    MAB @ 10 weeks   • LASIK     • WISDOM TOOTH EXTRACTION        OB History          6    Para   3    Term   2       1    AB   2    Living   2         SAB   2    IAB   0    Ectopic   0    Molar   0    Multiple   0    Live Births   2                Objective     Vital Signs  /77   Wt 81.6 kg (180 lb)   LMP 2024 (Exact Date)   Estimated body mass index is 29.95 kg/m² as calculated from the following:    Height as of 24: 165.1 cm (65\").    Weight as of this encounter: 81.6 kg (180 lb).    Physical Exam    Ultrasound Impression:   See Viewpoint    Assessment and Plan     Jazzy Negrete is a 39 y.o.  20w5d who presents today for AMA, MTHFR    Diagnoses and all orders for this visit:    1. Multigravida of advanced maternal age in second trimester (Primary)  Assessment & Plan:  The patient will be 39 years old at the time of delivery.  She was quoted the following age-related risks at term:  Risk for Down syndrome 1 in 175, risk for any chromosomal abnormality 1 in 100.  Options in genetic testing and genetic screening were discussed with the patient.  I noted an option of genetic testing in the 2nd trimester of transabdominal amniocentesis for the determination of fetal chromosomal complement as well as amniotic fluid alpha-fetoprotein for the evaluation of a fetal open neural tube defect.  A procedure-related fetal loss rate of 1 in 500 would be quoted with midtrimester amniocentesis.  I also discussed the option of analysis of cell-free fetal DNA in maternal blood.  I noted this directed analysis measures the relative proportion of chromosomes with the detection rate of fetal Down syndrome quoted as 99% with a false positive rate of less than .1%.  Screening for other fetal aneuploidies is also possible but the detection rate is lower with cell-free fetal DNA technology.  I then discussed the clinical utility " SUBJECTIVE: Patient seen and examined on AM rounds. Patient extubated. No events overnight Patient reports that they're feeling well. Denies fever, chills. Reports pain as controlled. No complaints at this time.     Interval:  - dec prn dilaudid 0.25   - follow INR (last eliquis 2/22)  - metoprolol 5q4  - monitor PTT  - derm consult, recommends plain plain petroleum jelly for allergic contact dermatitis   - dc'd a line  - LR 30cc/hr  - 40 lasix  -extubated    Vital Signs Last 24 Hrs  T(C): 36.6 (24 Feb 2024 03:00), Max: 36.8 (23 Feb 2024 13:00)  T(F): 97.9 (24 Feb 2024 03:00), Max: 98.3 (23 Feb 2024 13:00)  HR: 119 (24 Feb 2024 05:00) (98 - 131)  BP: 116/62 (24 Feb 2024 05:00) (109/56 - 142/61)  BP(mean): 83 (24 Feb 2024 05:00) (78 - 105)  RR: 16 (24 Feb 2024 05:00) (10 - 20)  SpO2: 93% (24 Feb 2024 05:00) (92% - 99%)    Parameters below as of 24 Feb 2024 03:00  Patient On (Oxygen Delivery Method): room air        General Appearance: Appears well, NAD, NGT in place  Neck: Supple  Chest: Equal expansion bilaterally  CV: Pulse regular presently  Abdomen: Soft, nontense, appropriate incisional tenderness, abthera in place holding suction  Extremities: [facility]    I&O's Summary    22 Feb 2024 07:01  -  23 Feb 2024 07:00  --------------------------------------------------------  IN: 2734.5 mL / OUT: 897 mL / NET: 1837.5 mL    23 Feb 2024 07:01  -  24 Feb 2024 05:27  --------------------------------------------------------  IN: 2204 mL / OUT: 1805 mL / NET: 399 mL      I&O's Detail    22 Feb 2024 07:01  -  23 Feb 2024 07:00  --------------------------------------------------------  IN:    Enteral Tube Flush: 40 mL    Heparin: 112 mL    Heparin: 32.5 mL    IV PiggyBack: 200 mL    IV PiggyBack: 250 mL    Lactated Ringers: 1100 mL    Lactated Ringers Bolus: 1000 mL  Total IN: 2734.5 mL    OUT:    Indwelling Catheter - Urethral (mL): 437 mL    Nasogastric/Oral tube (mL): 260 mL    VAC (Vacuum Assisted Closure) System (mL): 200 mL  Total OUT: 897 mL    Total NET: 1837.5 mL      23 Feb 2024 07:01  -  24 Feb 2024 05:27  --------------------------------------------------------  IN:    Enteral Tube Flush: 40 mL    Heparin: 52 mL    Heparin: 142 mL    IV PiggyBack: 300 mL    IV PiggyBack: 50 mL    IV PiggyBack: 400 mL    Lactated Ringers: 800 mL    Lactated Ringers: 210 mL    Lactated Ringers: 210 mL  Total IN: 2204 mL    OUT:    Indwelling Catheter - Urethral (mL): 1505 mL    Nasogastric/Oral tube (mL): 100 mL    VAC (Vacuum Assisted Closure) System (mL): 200 mL  Total OUT: 1805 mL    Total NET: 399 mL          LABS:                        11.8   19.30 )-----------( 179      ( 24 Feb 2024 00:56 )             36.1     02-24    140  |  104  |  20  ----------------------------<  128<H>  3.6   |  25  |  1.07    Ca    8.3<L>      24 Feb 2024 00:56  Phos  3.7     02-24  Mg     2.1     02-24    TPro  5.6<L>  /  Alb  2.7<L>  /  TBili  0.6  /  DBili  x   /  AST  21  /  ALT  10  /  AlkPhos  58  02-24    PT/INR - ( 24 Feb 2024 00:56 )   PT: 21.6 sec;   INR: 2.00 ratio         PTT - ( 24 Feb 2024 00:56 )  PTT:73.4 sec  Urinalysis Basic - ( 24 Feb 2024 00:56 )    Color: x / Appearance: x / SG: x / pH: x  Gluc: 128 mg/dL / Ketone: x  / Bili: x / Urobili: x   Blood: x / Protein: x / Nitrite: x   Leuk Esterase: x / RBC: x / WBC x   Sq Epi: x / Non Sq Epi: x / Bacteria: x        RADIOLOGY & ADDITIONAL STUDIES: of ultrasound and/or biochemical screening for the detection of fetal aneuploidy as well as fetal open neural tube defects.  Further, I have reviewed her self-reported family history and made suggestions as appropriate based on my review.      Patient is already had a cell free DNA screen that returned as low risk.  We discussed that this significantly lowers the risk of chromosomal abnormalities but there is no eliminate risk.  Amniocentesis was again discussed.  The procedure was explained the risks including risk of pregnancy loss were outlined.  After careful consideration the patient declines amniocentesis.    Orders:  -     [facility]; Future    2. Cystic fibrosis carrier  Assessment & Plan:  The patient previously underwent cystic fibrosis carrier status determination which returned positive.  The father of her fetus is  and therefore has a background risk for being a carrier of the autosomal recessive gene for cystic fibrosis of 1 in 25.  Should the father of the fetus undergo screening for cystic fibrosis and return positive, this would place this couple's risk for having a child with cystic fibrosis disease as 1 in 4.  Should he undergo screening for cystic fibrosis and it return negative, this would decrease their risk for having a child with cystic fibrosis disease to 1 in 1000 (1 x 1 in 4 x 1 in 250).  Should the father of the fetus decline screening, their background risk for a child with cystic fibrosis disease is 1 in 100 (1 x 1 in 4 x 1 in 25).      Orders:  -     [facility]; Future    3. Compound heterozygous MTHFR mutation C677T/[id number]  Assessment & Plan:  Patient referred for pregnancy complicated by advanced maternal age.  Additionally the patient is a compound heterozygote for MTHFR and cystic fibrosis carrier.  Patient has not had any DVT or pulmonary embolism.  She was discovered to be a compound heterozygote in workup for recurrent  miscarriage.  Patient is currently on low-dose aspirin.  She reports no complications in this pregnancy.    Ultrasound today demonstrates a normally grown fetus with no abnormality seen.  In particular no fetal markers for trisomy.  Amniotic fluid volume and cervical length were normal.    Patient was counseled regarding advanced maternal age and note is included.  With regard to her MTHFR status she is at low risk for complications and I agree with low-dose aspirin as therapy for in this patient.  As occasionally MTHFR patients may have difficulty with fetal growth we will rescan the patient again in 8 weeks time to assess fetal growth and wellbeing.    Orders:  -     [facility]; Future    4. History of IUFD  -     [facility]; Future    5. Pregnancy, unspecified gestational age  -     [facility]; Future         Follow Up  Return in about 8 weeks (around 2025).    I spent 30 minutes caring for the patient on the day of service. This included: obtaining or reviewing a separately obtained medical history, reviewing patient records, performing a medically appropriate exam and/or evaluation, counseling or educating the patient/family/caregiver, ordering medications, labs, and/or procedures and documenting such in the medical record. This does not include time spent on review and interpretation of other tests such as fetal ultrasound or the performance of other procedures such as amniocentesis or CVS.      Douglas A. Milligan, MD  Maternal Fetal Medicine, [facility] [place]     2025

## 2025-04-24 NOTE — ASSESSMENT & PLAN NOTE
The patient previously underwent cystic fibrosis carrier status determination which returned positive.  The father of her fetus is  and therefore has a background risk for being a carrier of the autosomal recessive gene for cystic fibrosis of 1 in 25.  Should the father of the fetus undergo screening for cystic fibrosis and return positive, this would place this couple's risk for having a child with cystic fibrosis disease as 1 in 4.  Should he undergo screening for cystic fibrosis and it return negative, this would decrease their risk for having a child with cystic fibrosis disease to 1 in 1000 (1 x 1 in 4 x 1 in 250).  Should the father of the fetus decline screening, their background risk for a child with cystic fibrosis disease is 1 in 100 (1 x 1 in 4 x 1 in 25).

## 2025-04-24 NOTE — ASSESSMENT & PLAN NOTE
Patient referred for pregnancy complicated by advanced maternal age.  Additionally the patient is a compound heterozygote for MTHFR and cystic fibrosis carrier.  Patient has not had any DVT or pulmonary embolism.  She was discovered to be a compound heterozygote in workup for recurrent miscarriage.  Patient is currently on low-dose aspirin.  She reports no complications in this pregnancy.    Ultrasound today demonstrates a normally grown fetus with no abnormality seen.  In particular no fetal markers for trisomy.  Amniotic fluid volume and cervical length were normal.    Patient was counseled regarding advanced maternal age and note is included.  With regard to her MTHFR status she is at low risk for complications and I agree with low-dose aspirin as therapy for in this patient.  As occasionally MTHFR patients may have difficulty with fetal growth we will rescan the patient again in 8 weeks time to assess fetal growth and wellbeing.

## 2025-04-24 NOTE — PROGRESS NOTES
"Documentation of the ultrasound findings, images, and interpretations will be available in the patient's Viewpoint report which is located in the imaging tab in chart review.    Maternal/Fetal Medicine Consult Note     Name: Jazzy Negrete    : 1985     MRN: [id number]     Referring Provider: GEE Mullen    Chief Complaint  AMA; Hx IUFD@20wk; MTHFR; CF carrier; Hx GTHN    Subjective     History of Present Illness:  Jazzy Negrete is a 39 y.o.  20w5d who presents today for AMA, MTHFR    KETAN: Estimated Date of Delivery: 25     ROS:   As noted in HPI.     Past Medical History:   Diagnosis Date    Abnormal ECG     Abnormal Pap smear of cervix     Anxiety     Cervical dysplasia     Compound heterozygous MTHFR mutation C677T/[id number]     Depression     GERD (gastroesophageal reflux disease)     Gestational hypertension 2015    Gestational hypertension 2015    History of IUFD 2014    Genetic testing consistent with trisomy 21    HPV (human papilloma virus) infection     Hypertension 2015    Gestational    Loss of hair 2016    Recurrent pregnancy loss, antepartum condition or complication     SVT (supraventricular tachycardia)     Threatened miscarriage 2014    Varicella       Past Surgical History:   Procedure Laterality Date    CARDIAC ELECTROPHYSIOLOGY PROCEDURE      DILATATION AND CURETTAGE  2014    MAB @ 10 weeks    LASIK      WISDOM TOOTH EXTRACTION        OB History          6    Para   3    Term   2       1    AB   2    Living   2         SAB   2    IAB   0    Ectopic   0    Molar   0    Multiple   0    Live Births   2                Objective     Vital Signs  /77   Wt 81.6 kg (180 lb)   LMP 2024 (Exact Date)   Estimated body mass index is 29.95 kg/m² as calculated from the following:    Height as of 24: 165.1 cm (65\").    Weight as of this encounter: 81.6 kg (180 lb).    Physical " Exam    Ultrasound Impression:   See Viewpoint    Assessment and Plan     Jazzy Negrete is a 39 y.o.  20w5d who presents today for AMA, MTHFR    Diagnoses and all orders for this visit:    1. Multigravida of advanced maternal age in second trimester (Primary)  Assessment & Plan:  The patient will be 39 years old at the time of delivery.  She was quoted the following age-related risks at term:  Risk for Down syndrome 1 in 175, risk for any chromosomal abnormality 1 in 100.  Options in genetic testing and genetic screening were discussed with the patient.  I noted an option of genetic testing in the 2nd trimester of transabdominal amniocentesis for the determination of fetal chromosomal complement as well as amniotic fluid alpha-fetoprotein for the evaluation of a fetal open neural tube defect.  A procedure-related fetal loss rate of 1 in 500 would be quoted with midtrimester amniocentesis.  I also discussed the option of analysis of cell-free fetal DNA in maternal blood.  I noted this directed analysis measures the relative proportion of chromosomes with the detection rate of fetal Down syndrome quoted as 99% with a false positive rate of less than .1%.  Screening for other fetal aneuploidies is also possible but the detection rate is lower with cell-free fetal DNA technology.  I then discussed the clinical utility of ultrasound and/or biochemical screening for the detection of fetal aneuploidy as well as fetal open neural tube defects.  Further, I have reviewed her self-reported family history and made suggestions as appropriate based on my review.      Patient is already had a cell free DNA screen that returned as low risk.  We discussed that this significantly lowers the risk of chromosomal abnormalities but there is no eliminate risk.  Amniocentesis was again discussed.  The procedure was explained the risks including risk of pregnancy loss were outlined.  After careful consideration the patient  declines amniocentesis.    Orders:  -     [facility]; Future    2. Cystic fibrosis carrier  Assessment & Plan:  The patient previously underwent cystic fibrosis carrier status determination which returned positive.  The father of her fetus is  and therefore has a background risk for being a carrier of the autosomal recessive gene for cystic fibrosis of 1 in 25.  Should the father of the fetus undergo screening for cystic fibrosis and return positive, this would place this couple's risk for having a child with cystic fibrosis disease as 1 in 4.  Should he undergo screening for cystic fibrosis and it return negative, this would decrease their risk for having a child with cystic fibrosis disease to 1 in 1000 (1 x 1 in 4 x 1 in 250).  Should the father of the fetus decline screening, their background risk for a child with cystic fibrosis disease is 1 in 100 (1 x 1 in 4 x 1 in 25).      Orders:  -     [facility]; Future    3. Compound heterozygous MTHFR mutation C677T/[id number]  Assessment & Plan:  Patient referred for pregnancy complicated by advanced maternal age.  Additionally the patient is a compound heterozygote for MTHFR and cystic fibrosis carrier.  Patient has not had any DVT or pulmonary embolism.  She was discovered to be a compound heterozygote in workup for recurrent miscarriage.  Patient is currently on low-dose aspirin.  She reports no complications in this pregnancy.    Ultrasound today demonstrates a normally grown fetus with no abnormality seen.  In particular no fetal markers for trisomy.  Amniotic fluid volume and cervical length were normal.    Patient was counseled regarding advanced maternal age and note is included.  With regard to her MTHFR status she is at low risk for complications and I agree with low-dose aspirin as therapy for in this patient.  As occasionally MTHFR patients may have difficulty with fetal growth we will rescan the patient  again in 8 weeks time to assess fetal growth and wellbeing.    Orders:  -     [facility]; Future    4. History of IUFD  -     [facility]; Future    5. Pregnancy, unspecified gestational age  -     [facility]; Future         Follow Up  Return in about 8 weeks (around 2025).    I spent 30 minutes caring for the patient on the day of service. This included: obtaining or reviewing a separately obtained medical history, reviewing patient records, performing a medically appropriate exam and/or evaluation, counseling or educating the patient/family/caregiver, ordering medications, labs, and/or procedures and documenting such in the medical record. This does not include time spent on review and interpretation of other tests such as fetal ultrasound or the performance of other procedures such as amniocentesis or CVS.      Douglas A. Milligan, MD  Maternal Fetal Medicine, [facility]    Diagnostic [place]     2025

## 2025-05-20 ENCOUNTER — ROUTINE PRENATAL (OUTPATIENT)
Dept: OBSTETRICS AND GYNECOLOGY | Facility: CLINIC | Age: 40
End: 2025-05-20
Payer: COMMERCIAL

## 2025-05-20 VITALS — WEIGHT: 185.6 LBS | BODY MASS INDEX: 30.89 KG/M2 | SYSTOLIC BLOOD PRESSURE: 112 MMHG | DIASTOLIC BLOOD PRESSURE: 60 MMHG

## 2025-05-20 DIAGNOSIS — Z14.1 CYSTIC FIBROSIS CARRIER: ICD-10-CM

## 2025-05-20 DIAGNOSIS — O13.2 GESTATIONAL HYPERTENSION, SECOND TRIMESTER: ICD-10-CM

## 2025-05-20 DIAGNOSIS — O23.42 URINARY TRACT INFECTION IN MOTHER DURING SECOND TRIMESTER OF PREGNANCY: ICD-10-CM

## 2025-05-20 DIAGNOSIS — Z34.82 PRENATAL CARE, SUBSEQUENT PREGNANCY IN SECOND TRIMESTER: Primary | ICD-10-CM

## 2025-05-20 DIAGNOSIS — Z87.59 HISTORY OF IUFD: ICD-10-CM

## 2025-05-20 DIAGNOSIS — Z15.89 COMPOUND HETEROZYGOUS MTHFR MUTATION C677T/A1298C: ICD-10-CM

## 2025-05-20 DIAGNOSIS — O09.522 MULTIGRAVIDA OF ADVANCED MATERNAL AGE IN SECOND TRIMESTER: ICD-10-CM

## 2025-05-20 NOTE — PROGRESS NOTES
OB FOLLOW UP  CC- Here for care of pregnancy        Jazzy Negrete is a 39 y.o.  24w3d patient being seen today for her obstetrical follow up visit. Patient reports being seen at the  clinic in [place] on 2025 for abdominal pain. Patient states that her abdominal pain was in her lower abdomen and wrapped around her low back. Patient was diagnosed with a UTI. Patient was prescribed Fosfomycin 3g for 1 dose    Her prenatal care is complicated by (and status) : see below.  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    ROS -   Patient Denies: leaking of fluid, vaginal bleeding, dysuria, excessive vomiting, and more than 6 contractions per hour  Fetal Movement : normal  Other than what is documented in the HPI, all other systems reviewed and are negative.       The additional following portions of the patient's history were reviewed and updated as appropriate: allergies, current medications, and problem list.      I have reviewed and agree with the HPI, ROS, and historical information as entered above. Crista Hackett MD      /60   Wt 84.2 kg (185 lb 9.6 oz)   LMP 2024 (Exact Date)   BMI 30.89 kg/m²       EXAM:     Prenatal Vitals  BP: 112/60  Weight: 84.2 kg (185 lb 9.6 oz)   Fetal Heart Rate: pos                       Assessment and Plan    Problem List Items Addressed This Visit          Chromosomal and Congenital     Cystic fibrosis carrier       Endocrine and Metabolic    Compound heterozygous MTHFR mutation C677T/[id number]        Gravid and     Multigravida of advanced maternal age in second trimester    Overview   Baby aspirin 81mg daily         Gestational hypertension    History of IUFD    Overview   G2 at 20 weeks- Genetic testing +Trisomy 21          Other Visit Diagnoses         Prenatal care, subsequent pregnancy in second trimester    -  Primary    Relevant Orders    POC Urinalysis Dipstick    Urine Culture - Urine, Urine, Clean Catch      Urinary tract infection in mother during second trimester of pregnancy        Relevant Orders    Urine Culture - Urine, Urine, Clean Catch            Pregnancy at 24w3d  Fetal status reassuring.  No US indicated today.  1 hour gtt, CBC, Antibody screen, TDAP, and RPR next visit. Instructions given  Discussed/encouraged TDAP vaccination after 28 weeks  Activity and Exercise discussed.  Return in about 5 weeks (around 2025) for 1 hour glucola, One hour glucola.      Crista Hackett MD  2025

## 2025-05-22 LAB
BACTERIA UR CULT: NO GROWTH
BACTERIA UR CULT: NORMAL

## 2025-07-02 ENCOUNTER — OFFICE VISIT (OUTPATIENT)
Dept: OBSTETRICS AND GYNECOLOGY | Facility: HOSPITAL | Age: 40
End: 2025-07-02
Payer: COMMERCIAL

## 2025-07-02 ENCOUNTER — HOSPITAL ENCOUNTER (OUTPATIENT)
Dept: WOMENS IMAGING | Facility: HOSPITAL | Age: 40
Discharge: HOME | End: 2025-07-02
Admitting: OBSTETRICS & GYNECOLOGY
Payer: COMMERCIAL

## 2025-07-02 ENCOUNTER — ROUTINE PRENATAL (OUTPATIENT)
Dept: OBSTETRICS AND GYNECOLOGY | Facility: CLINIC | Age: 40
End: 2025-07-02
Payer: COMMERCIAL

## 2025-07-02 VITALS — WEIGHT: 191 LBS | SYSTOLIC BLOOD PRESSURE: 114 MMHG | BODY MASS INDEX: 31.78 KG/M2 | DIASTOLIC BLOOD PRESSURE: 62 MMHG

## 2025-07-02 VITALS — DIASTOLIC BLOOD PRESSURE: 78 MMHG | SYSTOLIC BLOOD PRESSURE: 118 MMHG | BODY MASS INDEX: 31.95 KG/M2 | WEIGHT: 192 LBS

## 2025-07-02 DIAGNOSIS — O09.523 MULTIGRAVIDA OF ADVANCED MATERNAL AGE IN THIRD TRIMESTER: Primary | ICD-10-CM

## 2025-07-02 DIAGNOSIS — Z14.1 CYSTIC FIBROSIS CARRIER: ICD-10-CM

## 2025-07-02 DIAGNOSIS — O09.522 MULTIGRAVIDA OF ADVANCED MATERNAL AGE IN SECOND TRIMESTER: ICD-10-CM

## 2025-07-02 DIAGNOSIS — Z15.89 COMPOUND HETEROZYGOUS MTHFR MUTATION C677T/A1298C: ICD-10-CM

## 2025-07-02 DIAGNOSIS — Z34.83 PRENATAL CARE, SUBSEQUENT PREGNANCY IN THIRD TRIMESTER: Primary | ICD-10-CM

## 2025-07-02 DIAGNOSIS — O09.523 MULTIGRAVIDA OF ADVANCED MATERNAL AGE IN THIRD TRIMESTER: ICD-10-CM

## 2025-07-02 DIAGNOSIS — F41.9 ANXIETY: ICD-10-CM

## 2025-07-02 DIAGNOSIS — O44.43 LOW-LYING PLACENTA WITHOUT HEMORRHAGE, THIRD TRIMESTER: ICD-10-CM

## 2025-07-02 DIAGNOSIS — N96 HISTORY OF MULTIPLE MISCARRIAGES: ICD-10-CM

## 2025-07-02 DIAGNOSIS — Z87.59 HISTORY OF IUFD: ICD-10-CM

## 2025-07-02 DIAGNOSIS — I47.10 SVT (SUPRAVENTRICULAR TACHYCARDIA): ICD-10-CM

## 2025-07-02 DIAGNOSIS — K21.9 GASTROESOPHAGEAL REFLUX DISEASE, UNSPECIFIED WHETHER ESOPHAGITIS PRESENT: ICD-10-CM

## 2025-07-02 DIAGNOSIS — Z34.90 PREGNANCY, UNSPECIFIED GESTATIONAL AGE: ICD-10-CM

## 2025-07-02 DIAGNOSIS — E72.10 DISORDER OF SULFUR-BEARING AMINO ACID METABOLISM: ICD-10-CM

## 2025-07-02 DIAGNOSIS — I34.1 MITRAL VALVE PROLAPSE: ICD-10-CM

## 2025-07-02 DIAGNOSIS — Z3A.30 30 WEEKS GESTATION OF PREGNANCY: ICD-10-CM

## 2025-07-02 LAB
GLUCOSE 1H P 50 G GLC PO SERPL-MCNC: 106 MG/DL (ref 65–139)
GLUCOSE UR STRIP-MCNC: NEGATIVE MG/DL
PROT UR STRIP-MCNC: NEGATIVE MG/DL

## 2025-07-02 PROCEDURE — 76816 OB US FOLLOW-UP PER FETUS: CPT

## 2025-07-02 NOTE — PROGRESS NOTES
"Documentation of the ultrasound findings, images, and interpretations will be available in the patient's Viewpoint report which is located in the imaging tab in [email or web address]    Maternal/Fetal Medicine Follow Up  Note     Name: Jazzy Negrete    : 1985     MRN: [id number]     Referring Provider: GEE Mullen    Chief Complaint  AMA- multip; hx IUFD; CF carrier; hx GHTN    Subjective     History of Present Illness:  Jazzy Negrete is a 39 y.o.  30w4d who presents today for AMA    KETAN: Estimated Date of Delivery: 25     ROS:   As noted in HPI.     Objective     Vital Signs  /78   Wt 87.1 kg (192 lb)   LMP 2024 (Exact Date)   Estimated body mass index is 31.95 kg/m² as calculated from the following:    Height as of 24: 165.1 cm (65\").    Weight as of this encounter: 87.1 kg (192 lb).    Physical Exam    Ultrasound Impression:   See VIewpoint    Assessment and Plan     Jazzy Negrete is a 39 y.o.  30w4d who presents today for AMA    Diagnoses and all orders for this visit:    1. Multigravida of advanced maternal age in third trimester (Primary)  Assessment & Plan:  Patient returns today for follow-up for pregnancy complicated by advanced maternal age.  Additionally patient is a cystic fibrosis carrier has anemia T HFR mutation and has a low-lying placenta.  Patient reports no complications since her last visit and notes good fetal movement.    Ultrasound today demonstrates a normally grown fetus, albeit with a abdominal circumference at nearly the 90th percentile.  Amniotic fluid volume and umbilical artery Dopplers are normal.  BPP is 8 out of 8.  Anterior placenta is 2.1 cm from the internal os.  There are no fetal markers for trisomy.    Fetus appears to be growing well.  Patient reports that she has her diabetes screen today and it will be interesting to see the results of that test.  We will rescan the patient again in 5 weeks time to assess fetal growth " as well as to ensure that the placenta has away from the internal os.  Different physicians have different cutoffs for low-lying placenta is allowing vaginal delivery, with the shortest distance generally being 2 cm.  The placenta currently is 2.1 cm from the internal os.    Patient was counseled extensively regarding fetal movement will contact her provider immediately if she notices any decreased fetal movement.    Orders:  -     [facility]; Future    2. Cystic fibrosis carrier  -     [facility]; Future    3. Compound heterozygous MTHFR mutation C677T/[id number]  -     [facility]; Future    4. History of IUFD  -     [facility]; Future    5. Pregnancy, unspecified gestational age  -     [facility]; Future    6. Low-lying placenta without hemorrhage, third trimester  -     [facility]; Future         Follow Up  Return in about 5 weeks (around 2025).    I spent 20 minutes caring for the patient on the day of service. This included: obtaining or reviewing a separately obtained medical history, reviewing patient records, performing a medically appropriate exam and/or evaluation, counseling or educating the patient/family/caregiver, ordering medications, labs, and/or procedures and documenting such in the medical record. This does not include time spent on review and interpretation of other tests such as fetal ultrasound or the performance of other procedures such as amniocentesis or CVS.      Douglas A. Milligan, MD  Maternal Fetal Medicine, [facility]     2025

## 2025-07-02 NOTE — PROGRESS NOTES
Patient denies any leaking of fluid, vaginal bleeding, or contractions.  NIPT negative.  Patient reports next follow-up appointment with Dr. Hackett' office is today.

## 2025-07-02 NOTE — PROGRESS NOTES
"      OB FOLLOW UP  CC- Here for care of pregnancy        Jazzy Negrete is a 39 y.o.  30w4d patient being seen today for her obstetrical follow up. Patient reports intermittent swelling in BLE.     Patient undergoing Glucola testing today. She is due for her testing at 0915.     MBT: A+  Rhogam: is not indicated.  28 week packet: reviewed with patient , counseled on fetal movement , pediatrician list reviewed, breast pump discussed, and childbirth classes reviewed  TDAP: given today  Ultrasound Today: Yes with PDC. Size consistent with dates but with AC at nearly 90% for dates. No fetal anomalies were identified. Amniotic fluid volume is normal. Umbilical artery S/D ratio is normal. Placenta is 2.1 cm from internal os.\". Patient scheduled for growth US with PDC on 2025. I have reviewed the preliminary US report. Final report pending physician review. Julianne Tomlinson, GEE      Does patient need tubal consent or  consent signed? Undecided. Patient will think about it and re-discuss signing consents next visit.    Her prenatal care is complicated by (and status) : see below.  Patient Active Problem List   Diagnosis    Disorder of sulfur-bearing amino acid metabolism    Mitral valve prolapse    SVT (supraventricular tachycardia)    Anxiety    Gastroesophageal reflux disease    Multigravida of advanced maternal age in third trimester    Cystic fibrosis carrier    Compound heterozygous MTHFR mutation C677T/V2000D    History of IUFD    History of multiple miscarriages    Low-lying placenta without hemorrhage, third trimester    Pregnant         ROS -   Patient Denies: Loss of Fluid, Vaginal Spotting, Vision Changes, Headaches, Nausea , Vomiting , Contractions, Epigastric pain, and skin itching  Fetal Movement : normal  Other than what is documented in the HPI, all other systems reviewed and are negative.     The additional following portions of the patient's history were reviewed and updated as " appropriate: allergies, current medications, and problem list.    I have reviewed and agree with the HPI, ROS, and historical information as entered above. Julianne HO Bushra, APRN      /62   Wt 86.6 kg (191 lb)   LMP 11/30/2024 (Exact Date)   BMI 31.78 kg/m²         EXAM:     Prenatal Vitals  BP: 114/62  Weight: 86.6 kg (191 lb)   Fetal Heart Rate: PDC               Urine Glucose Read-only: Negative  Urine Protein Read-only: Negative         Assessment and Plan    Problem List Items Addressed This Visit       Anxiety    Overview   9/22/2020- Currently takes zoloft 50 mg daily.  LEXAPRO 10mg QD         Compound heterozygous MTHFR mutation C677T/K4275A    Relevant Orders    CBC (No Diff) (Completed)    Gestational Screen 1 Hr ([facility]) (Completed)    Antibody Screen (Completed)    RPR, Rfx Qn RPR / Confirm TP (Completed)    Cystic fibrosis carrier    Relevant Orders    CBC (No Diff) (Completed)    Gestational Screen 1 Hr ([facility]) (Completed)    Antibody Screen (Completed)    RPR, Rfx Qn RPR / Confirm TP (Completed)    Disorder of sulfur-bearing amino acid metabolism    Gastroesophageal reflux disease    Overview   Pepcid 20         Relevant Medications    famotidine (PEPCID) 20 MG tablet    History of IUFD    Overview   G2 at 20 weeks- Genetic testing +Trisomy 21         History of multiple miscarriages    Low-lying placenta without hemorrhage, third trimester    Mitral valve prolapse    Multigravida of advanced maternal age in third trimester    Overview   Baby aspirin 81mg daily         Relevant Orders    CBC (No Diff) (Completed)    Gestational Screen 1 Hr ([facility]) (Completed)    Antibody Screen (Completed)    RPR, Rfx Qn RPR / Confirm TP (Completed)    Pregnant    Overview   7/2/25- PDC AC 86%, EFW 68%. MVP 6.7 cm. YUMI 16.7 cm.  Placenta: anterior, low lying, wrap around.         SVT (supraventricular tachycardia)    Overview   9/22/2020- Currently takes metoprolol 25 mg daily.          Other Visit  Diagnoses         Prenatal care, subsequent pregnancy in third trimester    -  Primary    Relevant Orders    POC Urinalysis Dipstick (Completed)    CBC (No Diff) (Completed)    Gestational Screen 1 Hr ([facility]) (Completed)    Antibody Screen (Completed)    RPR, Rfx Qn RPR / Confirm TP (Completed)    Tdap Vaccine => 6yo IM (BOOSTRIX/ADACEL)            Pregnancy at 30w4d  PDC report reviewed.   1 hr Glucola, CBC, RPR. Antibody screen and TDAP next visit  BLE swelling- Increase water intake to 80 oz/day, elevate bilateral lower extremities, compression stockings/socks, monitor sodium intake <1,500 mg/day.  Fetal movement/PTL or Labor precautions  Patient is on Prenatal vitamins  Discussed/encouraged TDAP vaccination after 28 weeks  Discussed/encouraged social distancing/COVID19 precautions; encouraged vaccination when able  Reviewed Pre-eclampsia signs/symptoms  Activity and Exercise discussed.  LGA education included in patient instructions.   Return in about 2 weeks (around 7/16/2025) for Lew Haynes.        Julianne Tomlinson, GEE  07/02/2025

## 2025-07-02 NOTE — ASSESSMENT & PLAN NOTE
Patient returns today for follow-up for pregnancy complicated by advanced maternal age.  Additionally patient is a cystic fibrosis carrier has anemia T HFR mutation and has a low-lying placenta.  Patient reports no complications since her last visit and notes good fetal movement.    Ultrasound today demonstrates a normally grown fetus, albeit with a abdominal circumference at nearly the 90th percentile.  Amniotic fluid volume and umbilical artery Dopplers are normal.  BPP is 8 out of 8.  Anterior placenta is 2.1 cm from the internal os.  There are no fetal markers for trisomy.    Fetus appears to be growing well.  Patient reports that she has her diabetes screen today and it will be interesting to see the results of that test.  We will rescan the patient again in 5 weeks time to assess fetal growth as well as to ensure that the placenta has away from the internal os.  Different physicians have different cutoffs for low-lying placenta is allowing vaginal delivery, with the shortest distance generally being 2 cm.  The placenta currently is 2.1 cm from the internal os.    Patient was counseled extensively regarding fetal movement will contact her provider immediately if she notices any decreased fetal movement.

## 2025-07-03 LAB
BLD GP AB SCN SERPL QL: NEGATIVE
ERYTHROCYTE [DISTWIDTH] IN BLOOD BY AUTOMATED COUNT: 12.3 % (ref 12.3–15.4)
HCT VFR BLD AUTO: 37 % (ref 34–46.6)
HGB BLD-MCNC: 12.5 G/DL (ref 12–15.9)
MCH RBC QN AUTO: 31.3 PG (ref 26.6–33)
MCHC RBC AUTO-ENTMCNC: 33.8 G/DL (ref 31.5–35.7)
MCV RBC AUTO: 92.5 FL (ref 79–97)
OBSERVATION IMP: NORMAL
PLATELET # BLD AUTO: 269 10*3/MM3 (ref 140–450)
RBC # BLD AUTO: 4 10*6/MM3 (ref 3.77–5.28)
RPR SER QL: NON REACTIVE
WBC # BLD AUTO: 9.47 10*3/MM3 (ref 3.4–10.8)

## 2025-07-17 ENCOUNTER — ROUTINE PRENATAL (OUTPATIENT)
Dept: OBSTETRICS AND GYNECOLOGY | Facility: CLINIC | Age: 40
End: 2025-07-17
Payer: COMMERCIAL

## 2025-07-17 VITALS — SYSTOLIC BLOOD PRESSURE: 112 MMHG | WEIGHT: 194 LBS | DIASTOLIC BLOOD PRESSURE: 62 MMHG | BODY MASS INDEX: 32.28 KG/M2

## 2025-07-17 DIAGNOSIS — Z3A.32 32 WEEKS GESTATION OF PREGNANCY: ICD-10-CM

## 2025-07-17 DIAGNOSIS — F41.9 ANXIETY: ICD-10-CM

## 2025-07-17 DIAGNOSIS — O09.523 MULTIGRAVIDA OF ADVANCED MATERNAL AGE IN THIRD TRIMESTER: ICD-10-CM

## 2025-07-17 DIAGNOSIS — Z34.83 PRENATAL CARE, SUBSEQUENT PREGNANCY IN THIRD TRIMESTER: Primary | ICD-10-CM

## 2025-07-17 DIAGNOSIS — O26.813 PREGNANCY RELATED FATIGUE IN THIRD TRIMESTER: ICD-10-CM

## 2025-07-17 DIAGNOSIS — O44.43 LOW-LYING PLACENTA WITHOUT HEMORRHAGE, THIRD TRIMESTER: ICD-10-CM

## 2025-07-17 LAB
GLUCOSE UR STRIP-MCNC: NEGATIVE MG/DL
PROT UR STRIP-MCNC: NEGATIVE MG/DL

## 2025-07-17 NOTE — PROGRESS NOTES
OB FOLLOW UP  CC- Here for care of pregnancy        Jazzy Negrete is a 39 y.o.  32w5d patient being seen today for her obstetrical follow up visit. Patient reports having an episode of post micturition incontinence. Patient states that she did not feel like she was urinating. Patient denies LOF. Patient also reports intermittent swelling in BLE.     Her prenatal care is complicated by (and status) :  see below.  Patient Active Problem List   Diagnosis    Disorder of sulfur-bearing amino acid metabolism    Mitral valve prolapse    SVT (supraventricular tachycardia)    Anxiety    Gastroesophageal reflux disease    Multigravida of advanced maternal age in third trimester    Cystic fibrosis carrier    Compound heterozygous MTHFR mutation C677T/I7993G    History of IUFD    History of multiple miscarriages    Low-lying placenta without hemorrhage, third trimester    Pregnant       TDAP status: given today  Rhogam status: was not indicated  28 week labs: Reviewed and normal  Ultrasound Today: No  Non Stress Test: No      ROS -   Patient Denies: Loss of Fluid, Vaginal Spotting, Vision Changes, Headaches, Nausea , Vomiting , Contractions, Epigastric pain, and skin itching  Fetal Movement : normal  Other than what is documented in the HPI, all other systems reviewed and are negative.     The additional following portions of the patient's history were reviewed and updated as appropriate: allergies, current medications, and problem list.    I have reviewed and agree with the HPI, ROS, and historical information as entered above. Crista Hackett MD      /62   Wt 88 kg (194 lb)   LMP 2024 (Exact Date)   BMI 32.28 kg/m²         EXAM:     Prenatal Vitals  BP: 112/62  Weight: 88 kg (194 lb)   Fetal Heart Rate: pos         SSE - no evidence of LOF      Urine Glucose Read-only: Negative  Urine Protein Read-only: Negative           Assessment and Plan    Problem List Items Addressed This Visit           Gravid and     Multigravida of advanced maternal age in third trimester    Overview   Baby aspirin 81mg daily         Relevant Orders    POC Urinalysis Dipstick (Completed)    Tdap Vaccine => 6yo IM (BOOSTRIX/ADACEL) (Completed)    Low-lying placenta without hemorrhage, third trimester    Pregnant    Overview   25- PDC AC 86%, EFW 68%. MVP 6.7 cm. YUMI 16.7 cm.  Placenta: anterior, low lying, wrap around.            Mental Health    Anxiety    Overview   2020- Currently takes zoloft 50 mg daily.  LEXAPRO 10mg QD          Other Visit Diagnoses         Prenatal care, subsequent pregnancy in third trimester    -  Primary    Relevant Orders    POC Urinalysis Dipstick (Completed)    Tdap Vaccine => 6yo IM (BOOSTRIX/ADACEL) (Completed)      Pregnancy related fatigue in third trimester        Relevant Orders    TSH    T4, Free    CBC (No Diff)            Pregnancy at 32w5d  Fatigue - labs today  No evidence of PROM  Fetal status reassuring.  28 week labs reviewed.    Activity and Exercise discussed.  Fetal movement/PTL or Labor precautions  Return in about 2 weeks (around 2025).    Crista Hackett MD  2025

## 2025-07-29 DIAGNOSIS — L29.9 ITCHING: Primary | ICD-10-CM

## 2025-07-30 LAB
ERYTHROCYTE [DISTWIDTH] IN BLOOD BY AUTOMATED COUNT: 12.4 % (ref 11.7–15.4)
HCT VFR BLD AUTO: 38.3 % (ref 34–46.6)
HGB BLD-MCNC: 12.5 G/DL (ref 11.1–15.9)
MCH RBC QN AUTO: 30.3 PG (ref 26.6–33)
MCHC RBC AUTO-ENTMCNC: 32.6 G/DL (ref 31.5–35.7)
MCV RBC AUTO: 93 FL (ref 79–97)
PLATELET # BLD AUTO: 233 X10E3/UL (ref 150–450)
RBC # BLD AUTO: 4.13 X10E6/UL (ref 3.77–5.28)
T4 FREE SERPL-MCNC: 0.73 NG/DL (ref 0.82–1.77)
TSH SERPL DL<=0.005 MIU/L-ACNC: 1.26 UIU/ML (ref 0.45–4.5)
WBC # BLD AUTO: 9 X10E3/UL (ref 3.4–10.8)

## 2025-07-31 ENCOUNTER — ROUTINE PRENATAL (OUTPATIENT)
Dept: OBSTETRICS AND GYNECOLOGY | Facility: CLINIC | Age: 40
End: 2025-07-31
Payer: COMMERCIAL

## 2025-07-31 VITALS — SYSTOLIC BLOOD PRESSURE: 122 MMHG | DIASTOLIC BLOOD PRESSURE: 74 MMHG | BODY MASS INDEX: 32.68 KG/M2 | WEIGHT: 196.4 LBS

## 2025-07-31 DIAGNOSIS — Z34.83 PRENATAL CARE, SUBSEQUENT PREGNANCY IN THIRD TRIMESTER: Primary | ICD-10-CM

## 2025-07-31 DIAGNOSIS — E03.8 SUBCLINICAL HYPOTHYROIDISM: ICD-10-CM

## 2025-07-31 DIAGNOSIS — O09.523 MULTIGRAVIDA OF ADVANCED MATERNAL AGE IN THIRD TRIMESTER: ICD-10-CM

## 2025-07-31 DIAGNOSIS — O44.43 LOW-LYING PLACENTA WITHOUT HEMORRHAGE, THIRD TRIMESTER: ICD-10-CM

## 2025-07-31 LAB
GLUCOSE UR STRIP-MCNC: NEGATIVE MG/DL
PROT UR STRIP-MCNC: NEGATIVE MG/DL

## 2025-07-31 RX ORDER — LEVOTHYROXINE SODIUM 25 UG/1
25 TABLET ORAL
Qty: 30 TABLET | Refills: 1 | Status: SHIPPED | OUTPATIENT
Start: 2025-07-31

## 2025-07-31 NOTE — PROGRESS NOTES
OB FOLLOW UP  CC- Here for care of pregnancy        Jazzy Negrete is a 39 y.o.  34w5d patient being seen today for her obstetrical follow up visit. Patient reports generalized itching that started 25, the past few days it has not been as persistent. She has bile acids and CMP pending collection.     Her prenatal care is complicated by (and status) : see below.  Patient Active Problem List   Diagnosis    Disorder of sulfur-bearing amino acid metabolism    Mitral valve prolapse    SVT (supraventricular tachycardia)    Anxiety    Gastroesophageal reflux disease    Multigravida of advanced maternal age in third trimester    Cystic fibrosis carrier    Compound heterozygous MTHFR mutation C677T/L4124G    History of IUFD    History of multiple miscarriages    Low-lying placenta without hemorrhage, third trimester    Pregnant       Ultrasound Today: No  Non Stress Test: No.    ROS -   Patient Denies: Loss of Fluid, Vaginal Spotting, Vision Changes, Headaches, Contractions, Epigastric pain, and skin itching  Fetal Movement : normal  Other than what is documented in the HPI, all other systems reviewed and are negative.       The additional following portions of the patient's history were reviewed and updated as appropriate: allergies and current medications.    I have reviewed and agree with the HPI, ROS, and historical information as entered above. Crista Hackett MD      /74   Wt 89.1 kg (196 lb 6.4 oz)   LMP 2024 (Exact Date)   BMI 32.68 kg/m²       EXAM:     Prenatal Vitals  BP: 122/74  Weight: 89.1 kg (196 lb 6.4 oz)   Fetal Heart Rate: pos       NST NOTE    Indiction :   AMA  FHR:          Reactive, Cat 1, No decels  Contractions:    Irregular  Time Monitored:   > 20 minutes         Urine Glucose Read-only: Negative  Urine Protein Read-only: Negative           Assessment and Plan    Problem List Items Addressed This Visit          Gravid and     Multigravida of advanced  maternal age in third trimester    Overview   Baby aspirin 81mg daily         Relevant Medications    aspirin 81 MG chewable tablet    escitalopram (Lexapro) 10 MG tablet    Low-lying placenta without hemorrhage, third trimester    Relevant Medications    aspirin 81 MG chewable tablet    escitalopram (Lexapro) 10 MG tablet     Other Visit Diagnoses         Prenatal care, subsequent pregnancy in third trimester    -  Primary    Relevant Orders    POC Urinalysis Dipstick (Completed)      Subclinical hypothyroidism        Relevant Medications    levothyroxine (Synthroid) 25 MCG tablet            Pregnancy at 34w5d  Fetal status reassuring.   Activity and Exercise discussed.  Fetal movement/PTL or Labor precautions  GBS next visit  Itching - labs today.  DIscussed low T4 and options.  Return in about 1 week (around 8/7/2025).    Crista Hackett MD  07/31/2025

## 2025-08-06 ENCOUNTER — HOSPITAL ENCOUNTER (OUTPATIENT)
Dept: WOMENS IMAGING | Facility: HOSPITAL | Age: 40
Discharge: HOME | End: 2025-08-06
Admitting: OBSTETRICS & GYNECOLOGY
Payer: COMMERCIAL

## 2025-08-06 ENCOUNTER — OFFICE VISIT (OUTPATIENT)
Dept: OBSTETRICS AND GYNECOLOGY | Facility: HOSPITAL | Age: 40
End: 2025-08-06
Payer: COMMERCIAL

## 2025-08-06 ENCOUNTER — ROUTINE PRENATAL (OUTPATIENT)
Dept: OBSTETRICS AND GYNECOLOGY | Facility: CLINIC | Age: 40
End: 2025-08-06
Payer: COMMERCIAL

## 2025-08-06 VITALS — BODY MASS INDEX: 32.52 KG/M2 | DIASTOLIC BLOOD PRESSURE: 66 MMHG | WEIGHT: 195.4 LBS | SYSTOLIC BLOOD PRESSURE: 112 MMHG

## 2025-08-06 VITALS — DIASTOLIC BLOOD PRESSURE: 71 MMHG | WEIGHT: 196.4 LBS | BODY MASS INDEX: 32.68 KG/M2 | SYSTOLIC BLOOD PRESSURE: 116 MMHG

## 2025-08-06 DIAGNOSIS — Z15.89 COMPOUND HETEROZYGOUS MTHFR MUTATION C677T/A1298C: ICD-10-CM

## 2025-08-06 DIAGNOSIS — Z34.90 PREGNANCY, UNSPECIFIED GESTATIONAL AGE: ICD-10-CM

## 2025-08-06 DIAGNOSIS — O44.43 LOW-LYING PLACENTA WITHOUT HEMORRHAGE, THIRD TRIMESTER: Primary | ICD-10-CM

## 2025-08-06 DIAGNOSIS — O09.523 MULTIGRAVIDA OF ADVANCED MATERNAL AGE IN THIRD TRIMESTER: ICD-10-CM

## 2025-08-06 DIAGNOSIS — O44.43 LOW-LYING PLACENTA WITHOUT HEMORRHAGE, THIRD TRIMESTER: ICD-10-CM

## 2025-08-06 DIAGNOSIS — Z87.59 HISTORY OF IUFD: ICD-10-CM

## 2025-08-06 DIAGNOSIS — Z14.1 CYSTIC FIBROSIS CARRIER: ICD-10-CM

## 2025-08-06 DIAGNOSIS — Z34.83 PRENATAL CARE, SUBSEQUENT PREGNANCY IN THIRD TRIMESTER: Primary | ICD-10-CM

## 2025-08-06 LAB
GLUCOSE UR STRIP-MCNC: NEGATIVE MG/DL
PROT UR STRIP-MCNC: NEGATIVE MG/DL

## 2025-08-06 PROCEDURE — 76816 OB US FOLLOW-UP PER FETUS: CPT

## 2025-08-06 PROCEDURE — 76817 TRANSVAGINAL US OBSTETRIC: CPT

## 2025-08-06 PROCEDURE — 76819 FETAL BIOPHYS PROFIL W/O NST: CPT

## 2025-08-12 ENCOUNTER — LAB (OUTPATIENT)
Dept: LAB | Facility: HOSPITAL | Age: 40
End: 2025-08-12
Payer: COMMERCIAL

## 2025-08-12 ENCOUNTER — ROUTINE PRENATAL (OUTPATIENT)
Dept: OBSTETRICS AND GYNECOLOGY | Facility: CLINIC | Age: 40
End: 2025-08-12
Payer: COMMERCIAL

## 2025-08-12 VITALS — BODY MASS INDEX: 33.05 KG/M2 | SYSTOLIC BLOOD PRESSURE: 118 MMHG | DIASTOLIC BLOOD PRESSURE: 70 MMHG | WEIGHT: 198.6 LBS

## 2025-08-12 DIAGNOSIS — Z3A.36 36 WEEKS GESTATION OF PREGNANCY: Primary | ICD-10-CM

## 2025-08-12 LAB
GLUCOSE UR STRIP-MCNC: NEGATIVE MG/DL
PROT UR STRIP-MCNC: NEGATIVE MG/DL

## 2025-08-12 PROCEDURE — 87081 CULTURE SCREEN ONLY: CPT

## 2025-08-15 LAB — BACTERIA SPEC AEROBE CULT: NORMAL

## 2025-08-20 ENCOUNTER — ROUTINE PRENATAL (OUTPATIENT)
Dept: OBSTETRICS AND GYNECOLOGY | Facility: CLINIC | Age: 40
End: 2025-08-20
Payer: COMMERCIAL

## 2025-08-20 VITALS — SYSTOLIC BLOOD PRESSURE: 118 MMHG | WEIGHT: 199.6 LBS | DIASTOLIC BLOOD PRESSURE: 72 MMHG | BODY MASS INDEX: 33.22 KG/M2

## 2025-08-20 DIAGNOSIS — O09.523 MULTIGRAVIDA OF ADVANCED MATERNAL AGE IN THIRD TRIMESTER: ICD-10-CM

## 2025-08-20 DIAGNOSIS — Z34.83 PRENATAL CARE, SUBSEQUENT PREGNANCY IN THIRD TRIMESTER: ICD-10-CM

## 2025-08-20 DIAGNOSIS — Z3A.37 37 WEEKS GESTATION OF PREGNANCY: Primary | ICD-10-CM

## 2025-08-20 DIAGNOSIS — Z87.59 HISTORY OF IUFD: ICD-10-CM

## 2025-08-20 PROBLEM — O44.43 LOW-LYING PLACENTA WITHOUT HEMORRHAGE, THIRD TRIMESTER: Status: RESOLVED | Noted: 2025-07-02 | Resolved: 2025-08-20

## 2025-08-20 LAB
GLUCOSE UR STRIP-MCNC: NEGATIVE MG/DL
PROT UR STRIP-MCNC: NEGATIVE MG/DL

## 2025-08-27 ENCOUNTER — ROUTINE PRENATAL (OUTPATIENT)
Dept: OBSTETRICS AND GYNECOLOGY | Facility: CLINIC | Age: 40
End: 2025-08-27
Payer: COMMERCIAL

## 2025-08-27 VITALS — SYSTOLIC BLOOD PRESSURE: 108 MMHG | DIASTOLIC BLOOD PRESSURE: 74 MMHG | BODY MASS INDEX: 33.61 KG/M2 | WEIGHT: 202 LBS

## 2025-08-27 DIAGNOSIS — Z34.83 PRENATAL CARE, SUBSEQUENT PREGNANCY IN THIRD TRIMESTER: ICD-10-CM

## 2025-08-27 DIAGNOSIS — Z3A.38 38 WEEKS GESTATION OF PREGNANCY: Primary | ICD-10-CM

## 2025-08-27 LAB
GLUCOSE UR STRIP-MCNC: NEGATIVE MG/DL
PROT UR STRIP-MCNC: NEGATIVE MG/DL

## 2025-08-29 ENCOUNTER — PREP FOR SURGERY (OUTPATIENT)
Dept: OTHER | Facility: HOSPITAL | Age: 40
End: 2025-08-29
Payer: COMMERCIAL

## 2025-08-29 RX ORDER — CALCIUM CARBONATE 500 MG/1
2 TABLET, CHEWABLE ORAL ONCE
Status: CANCELLED | OUTPATIENT
Start: 2025-08-29

## 2025-08-29 RX ORDER — IBUPROFEN 600 MG/1
600 TABLET, FILM COATED ORAL EVERY 6 HOURS PRN
Status: CANCELLED | OUTPATIENT
Start: 2025-08-29

## 2025-08-29 RX ORDER — OXYTOCIN/0.9 % SODIUM CHLORIDE 30/500 ML
250 PLASTIC BAG, INJECTION (ML) INTRAVENOUS CONTINUOUS
Status: CANCELLED | OUTPATIENT
Start: 2025-08-29 | End: 2025-08-29

## 2025-08-29 RX ORDER — OXYTOCIN/0.9 % SODIUM CHLORIDE 30/500 ML
999 PLASTIC BAG, INJECTION (ML) INTRAVENOUS ONCE
Status: CANCELLED | OUTPATIENT
Start: 2025-08-29 | End: 2025-08-29

## 2025-08-29 RX ORDER — SODIUM CHLORIDE 9 MG/ML
40 INJECTION, SOLUTION INTRAVENOUS AS NEEDED
Status: CANCELLED | OUTPATIENT
Start: 2025-08-29

## 2025-08-29 RX ORDER — MAGNESIUM CARB/ALUMINUM HYDROX 105-160MG
30 TABLET,CHEWABLE ORAL ONCE
Status: CANCELLED | OUTPATIENT
Start: 2025-08-29 | End: 2025-08-29

## 2025-08-29 RX ORDER — SODIUM CHLORIDE 0.9 % (FLUSH) 0.9 %
10 SYRINGE (ML) INJECTION AS NEEDED
Status: CANCELLED | OUTPATIENT
Start: 2025-08-29

## 2025-08-29 RX ORDER — OXYTOCIN/0.9 % SODIUM CHLORIDE 30/500 ML
2-20 PLASTIC BAG, INJECTION (ML) INTRAVENOUS
Status: CANCELLED | OUTPATIENT
Start: 2025-08-29

## 2025-08-29 RX ORDER — ACETAMINOPHEN 325 MG/1
650 TABLET ORAL EVERY 4 HOURS PRN
Status: CANCELLED | OUTPATIENT
Start: 2025-08-29

## 2025-08-29 RX ORDER — ONDANSETRON 4 MG/1
4 TABLET, ORALLY DISINTEGRATING ORAL EVERY 6 HOURS PRN
Status: CANCELLED | OUTPATIENT
Start: 2025-08-29

## 2025-08-29 RX ORDER — PROPRANOLOL HYDROCHLORIDE 1 MG/ML
1 INJECTION, SOLUTION INTRAVENOUS ONCE
Status: CANCELLED | OUTPATIENT
Start: 2025-08-29 | End: 2025-08-29

## 2025-08-29 RX ORDER — METHYLERGONOVINE MALEATE 0.2 MG/ML
200 INJECTION INTRAVENOUS ONCE AS NEEDED
Status: CANCELLED | OUTPATIENT
Start: 2025-08-29

## 2025-08-29 RX ORDER — LIDOCAINE HYDROCHLORIDE 10 MG/ML
0.5 INJECTION, SOLUTION EPIDURAL; INFILTRATION; INTRACAUDAL; PERINEURAL ONCE AS NEEDED
Status: CANCELLED | OUTPATIENT
Start: 2025-08-29

## 2025-08-29 RX ORDER — CARBOPROST TROMETHAMINE 250 UG/ML
250 INJECTION, SOLUTION INTRAMUSCULAR AS NEEDED
Status: CANCELLED | OUTPATIENT
Start: 2025-08-29

## 2025-08-29 RX ORDER — ONDANSETRON 2 MG/ML
4 INJECTION INTRAMUSCULAR; INTRAVENOUS EVERY 6 HOURS PRN
Status: CANCELLED | OUTPATIENT
Start: 2025-08-29

## 2025-08-29 RX ORDER — SODIUM CHLORIDE, SODIUM LACTATE, POTASSIUM CHLORIDE, CALCIUM CHLORIDE 600; 310; 30; 20 MG/100ML; MG/100ML; MG/100ML; MG/100ML
125 INJECTION, SOLUTION INTRAVENOUS CONTINUOUS
Status: CANCELLED | OUTPATIENT
Start: 2025-08-29 | End: 2025-08-30

## 2025-08-29 RX ORDER — SODIUM CHLORIDE 0.9 % (FLUSH) 0.9 %
10 SYRINGE (ML) INJECTION EVERY 12 HOURS SCHEDULED
Status: CANCELLED | OUTPATIENT
Start: 2025-08-29

## 2025-08-29 RX ORDER — MISOPROSTOL 200 UG/1
800 TABLET ORAL AS NEEDED
Status: CANCELLED | OUTPATIENT
Start: 2025-08-29

## 2025-08-29 RX ORDER — HYDROCODONE BITARTRATE AND ACETAMINOPHEN 10; 325 MG/1; MG/1
1 TABLET ORAL EVERY 4 HOURS PRN
Refills: 0 | Status: CANCELLED | OUTPATIENT
Start: 2025-08-29 | End: 2025-09-08

## 2025-08-31 ENCOUNTER — PREP FOR SURGERY (OUTPATIENT)
Dept: OTHER | Facility: HOSPITAL | Age: 40
End: 2025-08-31
Payer: COMMERCIAL